# Patient Record
Sex: FEMALE | Race: WHITE | Employment: FULL TIME | URBAN - NONMETROPOLITAN AREA
[De-identification: names, ages, dates, MRNs, and addresses within clinical notes are randomized per-mention and may not be internally consistent; named-entity substitution may affect disease eponyms.]

---

## 2017-01-28 ENCOUNTER — OFFICE VISIT (OUTPATIENT)
Dept: URGENT CARE | Age: 42
End: 2017-01-28
Payer: COMMERCIAL

## 2017-01-28 VITALS
WEIGHT: 266 LBS | HEIGHT: 72 IN | BODY MASS INDEX: 36.03 KG/M2 | SYSTOLIC BLOOD PRESSURE: 122 MMHG | TEMPERATURE: 98.8 F | DIASTOLIC BLOOD PRESSURE: 87 MMHG | HEART RATE: 88 BPM | OXYGEN SATURATION: 99 %

## 2017-01-28 DIAGNOSIS — J32.9 SINUSITIS, UNSPECIFIED CHRONICITY, UNSPECIFIED LOCATION: ICD-10-CM

## 2017-01-28 DIAGNOSIS — J40 BRONCHITIS: Primary | ICD-10-CM

## 2017-01-28 PROCEDURE — 99213 OFFICE O/P EST LOW 20 MIN: CPT | Performed by: NURSE PRACTITIONER

## 2017-01-28 RX ORDER — BENZONATATE 100 MG/1
200 CAPSULE ORAL 3 TIMES DAILY PRN
Qty: 30 CAPSULE | Refills: 0 | Status: SHIPPED | OUTPATIENT
Start: 2017-01-28 | End: 2017-02-04

## 2017-01-28 RX ORDER — AMOXICILLIN AND CLAVULANATE POTASSIUM 875; 125 MG/1; MG/1
1 TABLET, FILM COATED ORAL 2 TIMES DAILY
Qty: 20 TABLET | Refills: 0 | Status: SHIPPED | OUTPATIENT
Start: 2017-01-28 | End: 2017-02-07

## 2017-01-28 ASSESSMENT — ENCOUNTER SYMPTOMS
VOMITING: 0
SHORTNESS OF BREATH: 0
EYE DISCHARGE: 0
ABDOMINAL PAIN: 0
PHOTOPHOBIA: 0
WHEEZING: 1
CONSTIPATION: 0
SINUS PRESSURE: 1
EYE PAIN: 0
DIARRHEA: 0
SORE THROAT: 0
COUGH: 1
NAUSEA: 0
BLOOD IN STOOL: 0

## 2017-03-10 ENCOUNTER — OFFICE VISIT (OUTPATIENT)
Dept: URGENT CARE | Age: 42
End: 2017-03-10
Payer: COMMERCIAL

## 2017-03-10 VITALS
BODY MASS INDEX: 34.95 KG/M2 | DIASTOLIC BLOOD PRESSURE: 98 MMHG | HEIGHT: 72 IN | SYSTOLIC BLOOD PRESSURE: 136 MMHG | WEIGHT: 258 LBS | RESPIRATION RATE: 20 BRPM | HEART RATE: 80 BPM | OXYGEN SATURATION: 96 % | TEMPERATURE: 98.1 F

## 2017-03-10 DIAGNOSIS — J06.9 UPPER RESPIRATORY TRACT INFECTION, UNSPECIFIED TYPE: Primary | ICD-10-CM

## 2017-03-10 PROCEDURE — 96372 THER/PROPH/DIAG INJ SC/IM: CPT | Performed by: NURSE PRACTITIONER

## 2017-03-10 PROCEDURE — 99213 OFFICE O/P EST LOW 20 MIN: CPT | Performed by: NURSE PRACTITIONER

## 2017-03-10 RX ORDER — DEXAMETHASONE SODIUM PHOSPHATE 4 MG/ML
4 INJECTION, SOLUTION INTRA-ARTICULAR; INTRALESIONAL; INTRAMUSCULAR; INTRAVENOUS; SOFT TISSUE ONCE
Status: COMPLETED | OUTPATIENT
Start: 2017-03-10 | End: 2017-03-10

## 2017-03-10 RX ORDER — METHYLPREDNISOLONE 4 MG/1
TABLET ORAL
Qty: 1 KIT | Refills: 0 | Status: SHIPPED | OUTPATIENT
Start: 2017-03-10 | End: 2017-03-16

## 2017-03-10 RX ORDER — ALBUTEROL SULFATE 90 UG/1
2 AEROSOL, METERED RESPIRATORY (INHALATION) EVERY 6 HOURS PRN
Qty: 1 INHALER | Refills: 0 | Status: SHIPPED | OUTPATIENT
Start: 2017-03-10 | End: 2017-06-30 | Stop reason: ALTCHOICE

## 2017-03-10 RX ORDER — AZITHROMYCIN 250 MG/1
TABLET, FILM COATED ORAL
Qty: 1 PACKET | Refills: 0 | Status: SHIPPED | OUTPATIENT
Start: 2017-03-10 | End: 2017-03-20

## 2017-03-10 RX ADMIN — DEXAMETHASONE SODIUM PHOSPHATE 4 MG: 4 INJECTION, SOLUTION INTRA-ARTICULAR; INTRALESIONAL; INTRAMUSCULAR; INTRAVENOUS; SOFT TISSUE at 19:06

## 2017-03-10 ASSESSMENT — ENCOUNTER SYMPTOMS
COUGH: 1
SORE THROAT: 0
SHORTNESS OF BREATH: 1
SINUS PRESSURE: 1
EYES NEGATIVE: 1
GASTROINTESTINAL NEGATIVE: 1
WHEEZING: 1

## 2017-06-30 ENCOUNTER — HOSPITAL ENCOUNTER (EMERGENCY)
Age: 42
Discharge: HOME | End: 2017-06-30
Attending: FAMILY MEDICINE
Payer: COMMERCIAL

## 2017-06-30 ENCOUNTER — APPOINTMENT (OUTPATIENT)
Dept: GENERAL RADIOLOGY | Age: 42
End: 2017-06-30
Payer: COMMERCIAL

## 2017-06-30 ENCOUNTER — APPOINTMENT (OUTPATIENT)
Dept: CT IMAGING | Age: 42
End: 2017-06-30
Payer: COMMERCIAL

## 2017-06-30 VITALS
DIASTOLIC BLOOD PRESSURE: 84 MMHG | OXYGEN SATURATION: 99 % | WEIGHT: 253 LBS | HEIGHT: 72 IN | TEMPERATURE: 98 F | HEART RATE: 68 BPM | BODY MASS INDEX: 34.27 KG/M2 | SYSTOLIC BLOOD PRESSURE: 124 MMHG | RESPIRATION RATE: 16 BRPM

## 2017-06-30 DIAGNOSIS — R07.89 OTHER CHEST PAIN: Primary | ICD-10-CM

## 2017-06-30 LAB
ALBUMIN SERPL-MCNC: 4.3 G/DL (ref 3.5–5.2)
ALP BLD-CCNC: 116 U/L (ref 35–104)
ALT SERPL-CCNC: 23 U/L (ref 5–33)
ANION GAP SERPL CALCULATED.3IONS-SCNC: 13 MMOL/L (ref 7–19)
AST SERPL-CCNC: 22 U/L (ref 5–32)
BASOPHILS ABSOLUTE: 0 K/UL (ref 0–0.2)
BASOPHILS RELATIVE PERCENT: 0.2 % (ref 0–1)
BILIRUB SERPL-MCNC: 0.4 MG/DL (ref 0.2–1.2)
BUN BLDV-MCNC: 12 MG/DL (ref 6–20)
CALCIUM SERPL-MCNC: 9 MG/DL (ref 8.6–10)
CHLORIDE BLD-SCNC: 103 MMOL/L (ref 98–111)
CO2: 25 MMOL/L (ref 22–29)
CREAT SERPL-MCNC: 0.7 MG/DL (ref 0.5–0.9)
D DIMER: 0.52 NG/ML DDU (ref 0–0.48)
EOSINOPHILS ABSOLUTE: 0.1 K/UL (ref 0–0.6)
EOSINOPHILS RELATIVE PERCENT: 0.8 % (ref 0–5)
GFR NON-AFRICAN AMERICAN: >60
GLUCOSE BLD-MCNC: 80 MG/DL (ref 74–109)
HCG QUALITATIVE: NEGATIVE
HCT VFR BLD CALC: 43.2 % (ref 37–47)
HEMOGLOBIN: 14.6 G/DL (ref 12–16)
INR BLD: 0.99 (ref 0.88–1.18)
LYMPHOCYTES ABSOLUTE: 3.5 K/UL (ref 1.1–4.5)
LYMPHOCYTES RELATIVE PERCENT: 35.6 % (ref 20–40)
MCH RBC QN AUTO: 30.7 PG (ref 27–31)
MCHC RBC AUTO-ENTMCNC: 33.8 G/DL (ref 33–37)
MCV RBC AUTO: 90.8 FL (ref 81–99)
MONOCYTES ABSOLUTE: 0.5 K/UL (ref 0–0.9)
MONOCYTES RELATIVE PERCENT: 5.5 % (ref 0–10)
NEUTROPHILS ABSOLUTE: 5.6 K/UL (ref 1.5–7.5)
NEUTROPHILS RELATIVE PERCENT: 57.7 % (ref 50–65)
PDW BLD-RTO: 12.6 % (ref 11.5–14.5)
PERFORMED ON: NORMAL
PERFORMED ON: NORMAL
PLATELET # BLD: 299 K/UL (ref 130–400)
PMV BLD AUTO: 9.4 FL (ref 9.4–12.3)
POC TROPONIN I: 0 NG/ML (ref 0–0.08)
POC TROPONIN I: 0 NG/ML (ref 0–0.08)
POTASSIUM SERPL-SCNC: 4.1 MMOL/L (ref 3.5–5)
PROTHROMBIN TIME: 13 SEC (ref 12–14.6)
RBC # BLD: 4.76 M/UL (ref 4.2–5.4)
SODIUM BLD-SCNC: 141 MMOL/L (ref 136–145)
TOTAL PROTEIN: 8.2 G/DL (ref 6.6–8.7)
WBC # BLD: 9.7 K/UL (ref 4.8–10.8)

## 2017-06-30 PROCEDURE — 93005 ELECTROCARDIOGRAM TRACING: CPT

## 2017-06-30 PROCEDURE — 84484 ASSAY OF TROPONIN QUANT: CPT

## 2017-06-30 PROCEDURE — 99282 EMERGENCY DEPT VISIT SF MDM: CPT | Performed by: FAMILY MEDICINE

## 2017-06-30 PROCEDURE — 6360000004 HC RX CONTRAST MEDICATION: Performed by: FAMILY MEDICINE

## 2017-06-30 PROCEDURE — 84703 CHORIONIC GONADOTROPIN ASSAY: CPT

## 2017-06-30 PROCEDURE — 71275 CT ANGIOGRAPHY CHEST: CPT

## 2017-06-30 PROCEDURE — 99285 EMERGENCY DEPT VISIT HI MDM: CPT

## 2017-06-30 PROCEDURE — 85379 FIBRIN DEGRADATION QUANT: CPT

## 2017-06-30 PROCEDURE — 6370000000 HC RX 637 (ALT 250 FOR IP)

## 2017-06-30 PROCEDURE — 85025 COMPLETE CBC W/AUTO DIFF WBC: CPT

## 2017-06-30 PROCEDURE — 80053 COMPREHEN METABOLIC PANEL: CPT

## 2017-06-30 PROCEDURE — 93971 EXTREMITY STUDY: CPT

## 2017-06-30 PROCEDURE — 71010 XR CHEST PORTABLE: CPT

## 2017-06-30 PROCEDURE — 85610 PROTHROMBIN TIME: CPT

## 2017-06-30 PROCEDURE — 6370000000 HC RX 637 (ALT 250 FOR IP): Performed by: FAMILY MEDICINE

## 2017-06-30 PROCEDURE — 36415 COLL VENOUS BLD VENIPUNCTURE: CPT

## 2017-06-30 RX ORDER — ASPIRIN 325 MG
TABLET ORAL
Status: COMPLETED
Start: 2017-06-30 | End: 2017-06-30

## 2017-06-30 RX ORDER — ASPIRIN 325 MG
325 TABLET ORAL ONCE
Status: DISCONTINUED | OUTPATIENT
Start: 2017-06-30 | End: 2017-07-01 | Stop reason: HOSPADM

## 2017-06-30 RX ADMIN — ASPIRIN 325 MG ORAL TABLET 325 MG: 325 PILL ORAL at 18:23

## 2017-06-30 RX ADMIN — IOVERSOL 90 ML: 741 INJECTION INTRA-ARTERIAL; INTRAVENOUS at 21:46

## 2017-06-30 ASSESSMENT — ENCOUNTER SYMPTOMS
CONSTIPATION: 0
TROUBLE SWALLOWING: 0
BACK PAIN: 0
WHEEZING: 0
APNEA: 0
VOMITING: 0
ABDOMINAL PAIN: 0
ABDOMINAL DISTENTION: 0
SHORTNESS OF BREATH: 0
SORE THROAT: 0
CHEST TIGHTNESS: 1
COUGH: 0
DIARRHEA: 0

## 2017-07-04 LAB
EKG P AXIS: -10 DEGREES
EKG P AXIS: 22 DEGREES
EKG P-R INTERVAL: 138 MS
EKG P-R INTERVAL: 142 MS
EKG Q-T INTERVAL: 386 MS
EKG Q-T INTERVAL: 428 MS
EKG QRS DURATION: 82 MS
EKG QRS DURATION: 84 MS
EKG QTC CALCULATION (BAZETT): 407 MS
EKG QTC CALCULATION (BAZETT): 435 MS
EKG T AXIS: 4 DEGREES
EKG T AXIS: 8 DEGREES

## 2017-07-14 ENCOUNTER — HOSPITAL ENCOUNTER (OUTPATIENT)
Dept: ULTRASOUND IMAGING | Age: 42
Discharge: HOME | End: 2017-07-14
Payer: COMMERCIAL

## 2017-07-14 DIAGNOSIS — R10.11 ABDOMINAL PAIN, RIGHT UPPER QUADRANT: ICD-10-CM

## 2017-07-14 PROCEDURE — 76705 ECHO EXAM OF ABDOMEN: CPT

## 2017-07-19 ENCOUNTER — OFFICE VISIT (OUTPATIENT)
Dept: URGENT CARE | Age: 42
End: 2017-07-19
Payer: COMMERCIAL

## 2017-07-19 VITALS
DIASTOLIC BLOOD PRESSURE: 91 MMHG | HEIGHT: 72 IN | SYSTOLIC BLOOD PRESSURE: 121 MMHG | HEART RATE: 82 BPM | OXYGEN SATURATION: 98 % | BODY MASS INDEX: 34.81 KG/M2 | RESPIRATION RATE: 20 BRPM | WEIGHT: 257 LBS | TEMPERATURE: 97.7 F

## 2017-07-19 DIAGNOSIS — L23.7 ALLERGIC CONTACT DERMATITIS DUE TO PLANTS, EXCEPT FOOD: Primary | ICD-10-CM

## 2017-07-19 DIAGNOSIS — J20.8 ACUTE BRONCHITIS DUE TO OTHER SPECIFIED ORGANISMS: ICD-10-CM

## 2017-07-19 PROCEDURE — 99213 OFFICE O/P EST LOW 20 MIN: CPT | Performed by: PHYSICIAN ASSISTANT

## 2017-07-19 RX ORDER — METHYLPREDNISOLONE 4 MG/1
TABLET ORAL
Qty: 1 KIT | Refills: 0 | Status: SHIPPED | OUTPATIENT
Start: 2017-07-19 | End: 2017-07-25

## 2017-07-19 RX ORDER — TRIAMCINOLONE ACETONIDE 40 MG/ML
40 INJECTION, SUSPENSION INTRA-ARTICULAR; INTRAMUSCULAR ONCE
Status: SHIPPED | OUTPATIENT
Start: 2017-07-19

## 2017-07-19 RX ORDER — DEXAMETHASONE SODIUM PHOSPHATE 10 MG/ML
10 INJECTION INTRAMUSCULAR; INTRAVENOUS ONCE
Status: DISCONTINUED | OUTPATIENT
Start: 2017-07-19 | End: 2017-07-19

## 2017-08-28 ENCOUNTER — OFFICE VISIT (OUTPATIENT)
Dept: GASTROENTEROLOGY | Facility: CLINIC | Age: 42
End: 2017-08-28

## 2017-08-28 VITALS
WEIGHT: 255 LBS | TEMPERATURE: 97.5 F | OXYGEN SATURATION: 100 % | HEIGHT: 72 IN | HEART RATE: 87 BPM | BODY MASS INDEX: 34.54 KG/M2 | DIASTOLIC BLOOD PRESSURE: 76 MMHG | SYSTOLIC BLOOD PRESSURE: 130 MMHG

## 2017-08-28 DIAGNOSIS — Z83.71 FAMILY HISTORY OF COLONIC POLYPS: ICD-10-CM

## 2017-08-28 DIAGNOSIS — R10.11 RIGHT UPPER QUADRANT ABDOMINAL PAIN: Primary | ICD-10-CM

## 2017-08-28 PROCEDURE — 99204 OFFICE O/P NEW MOD 45 MIN: CPT | Performed by: NURSE PRACTITIONER

## 2017-08-29 PROBLEM — R10.10 PAIN OF UPPER ABDOMEN: Status: ACTIVE | Noted: 2017-08-29

## 2017-09-20 ENCOUNTER — ANESTHESIA (OUTPATIENT)
Dept: GASTROENTEROLOGY | Facility: HOSPITAL | Age: 42
End: 2017-09-20

## 2017-09-20 ENCOUNTER — PREP FOR SURGERY (OUTPATIENT)
Dept: OTHER | Facility: HOSPITAL | Age: 42
End: 2017-09-20

## 2017-09-20 ENCOUNTER — HOSPITAL ENCOUNTER (OUTPATIENT)
Facility: HOSPITAL | Age: 42
Setting detail: HOSPITAL OUTPATIENT SURGERY
Discharge: HOME | End: 2017-09-20
Attending: INTERNAL MEDICINE | Admitting: INTERNAL MEDICINE

## 2017-09-20 ENCOUNTER — ANESTHESIA EVENT (OUTPATIENT)
Dept: GASTROENTEROLOGY | Facility: HOSPITAL | Age: 42
End: 2017-09-20

## 2017-09-20 VITALS
BODY MASS INDEX: 34.27 KG/M2 | WEIGHT: 253 LBS | HEART RATE: 72 BPM | RESPIRATION RATE: 18 BRPM | OXYGEN SATURATION: 99 % | HEIGHT: 72 IN | SYSTOLIC BLOOD PRESSURE: 118 MMHG | DIASTOLIC BLOOD PRESSURE: 72 MMHG | TEMPERATURE: 97.4 F

## 2017-09-20 DIAGNOSIS — R10.11 RIGHT UPPER QUADRANT ABDOMINAL PAIN: ICD-10-CM

## 2017-09-20 DIAGNOSIS — R10.84 GENERALIZED ABDOMINAL PAIN: Primary | ICD-10-CM

## 2017-09-20 LAB — B-HCG UR QL: NEGATIVE

## 2017-09-20 PROCEDURE — 45378 DIAGNOSTIC COLONOSCOPY: CPT | Performed by: INTERNAL MEDICINE

## 2017-09-20 PROCEDURE — 25010000002 PROPOFOL 10 MG/ML EMULSION: Performed by: NURSE ANESTHETIST, CERTIFIED REGISTERED

## 2017-09-20 PROCEDURE — 81025 URINE PREGNANCY TEST: CPT | Performed by: ANESTHESIOLOGY

## 2017-09-20 RX ORDER — SODIUM CHLORIDE 9 MG/ML
500 INJECTION, SOLUTION INTRAVENOUS CONTINUOUS PRN
Status: DISCONTINUED | OUTPATIENT
Start: 2017-09-20 | End: 2017-09-20 | Stop reason: HOSPADM

## 2017-09-20 RX ORDER — LIDOCAINE HYDROCHLORIDE 20 MG/ML
INJECTION, SOLUTION INFILTRATION; PERINEURAL AS NEEDED
Status: DISCONTINUED | OUTPATIENT
Start: 2017-09-20 | End: 2017-09-20 | Stop reason: SURG

## 2017-09-20 RX ORDER — PROPOFOL 10 MG/ML
VIAL (ML) INTRAVENOUS AS NEEDED
Status: DISCONTINUED | OUTPATIENT
Start: 2017-09-20 | End: 2017-09-20 | Stop reason: SURG

## 2017-09-20 RX ORDER — SODIUM CHLORIDE 0.9 % (FLUSH) 0.9 %
3 SYRINGE (ML) INJECTION AS NEEDED
Status: DISCONTINUED | OUTPATIENT
Start: 2017-09-20 | End: 2017-09-20 | Stop reason: HOSPADM

## 2017-09-20 RX ADMIN — LIDOCAINE HYDROCHLORIDE 100 MG: 20 INJECTION, SOLUTION INFILTRATION; PERINEURAL at 11:55

## 2017-09-20 RX ADMIN — PROPOFOL 300 MG: 10 INJECTION, EMULSION INTRAVENOUS at 11:55

## 2017-09-20 RX ADMIN — SODIUM CHLORIDE 500 ML: 9 INJECTION, SOLUTION INTRAVENOUS at 10:34

## 2017-09-21 ENCOUNTER — TELEPHONE (OUTPATIENT)
Dept: GASTROENTEROLOGY | Facility: CLINIC | Age: 42
End: 2017-09-21

## 2017-11-08 PROBLEM — R10.84 GENERALIZED ABDOMINAL PAIN: Status: ACTIVE | Noted: 2017-11-08

## 2017-11-10 ENCOUNTER — ANESTHESIA EVENT (OUTPATIENT)
Dept: GASTROENTEROLOGY | Facility: HOSPITAL | Age: 42
End: 2017-11-10

## 2017-11-10 ENCOUNTER — ANESTHESIA (OUTPATIENT)
Dept: GASTROENTEROLOGY | Facility: HOSPITAL | Age: 42
End: 2017-11-10

## 2017-11-10 ENCOUNTER — HOSPITAL ENCOUNTER (OUTPATIENT)
Facility: HOSPITAL | Age: 42
Setting detail: HOSPITAL OUTPATIENT SURGERY
Discharge: HOME | End: 2017-11-10
Attending: INTERNAL MEDICINE | Admitting: INTERNAL MEDICINE

## 2017-11-10 VITALS
RESPIRATION RATE: 17 BRPM | HEIGHT: 72 IN | HEART RATE: 66 BPM | OXYGEN SATURATION: 96 % | WEIGHT: 255 LBS | BODY MASS INDEX: 34.54 KG/M2 | SYSTOLIC BLOOD PRESSURE: 115 MMHG | TEMPERATURE: 97.1 F | DIASTOLIC BLOOD PRESSURE: 90 MMHG

## 2017-11-10 DIAGNOSIS — R10.84 GENERALIZED ABDOMINAL PAIN: ICD-10-CM

## 2017-11-10 LAB — B-HCG UR QL: NEGATIVE

## 2017-11-10 PROCEDURE — 87081 CULTURE SCREEN ONLY: CPT | Performed by: INTERNAL MEDICINE

## 2017-11-10 PROCEDURE — 81025 URINE PREGNANCY TEST: CPT | Performed by: ANESTHESIOLOGY

## 2017-11-10 PROCEDURE — 25010000002 PROPOFOL 10 MG/ML EMULSION: Performed by: NURSE ANESTHETIST, CERTIFIED REGISTERED

## 2017-11-10 PROCEDURE — 43239 EGD BIOPSY SINGLE/MULTIPLE: CPT | Performed by: INTERNAL MEDICINE

## 2017-11-10 PROCEDURE — 88305 TISSUE EXAM BY PATHOLOGIST: CPT | Performed by: INTERNAL MEDICINE

## 2017-11-10 RX ORDER — LIDOCAINE HYDROCHLORIDE 20 MG/ML
INJECTION, SOLUTION INFILTRATION; PERINEURAL AS NEEDED
Status: DISCONTINUED | OUTPATIENT
Start: 2017-11-10 | End: 2017-11-10 | Stop reason: SURG

## 2017-11-10 RX ORDER — SODIUM CHLORIDE 0.9 % (FLUSH) 0.9 %
3 SYRINGE (ML) INJECTION AS NEEDED
Status: DISCONTINUED | OUTPATIENT
Start: 2017-11-10 | End: 2017-11-10 | Stop reason: HOSPADM

## 2017-11-10 RX ORDER — SODIUM CHLORIDE 9 MG/ML
500 INJECTION, SOLUTION INTRAVENOUS CONTINUOUS PRN
Status: DISCONTINUED | OUTPATIENT
Start: 2017-11-10 | End: 2017-11-10 | Stop reason: HOSPADM

## 2017-11-10 RX ORDER — PROPOFOL 10 MG/ML
VIAL (ML) INTRAVENOUS AS NEEDED
Status: DISCONTINUED | OUTPATIENT
Start: 2017-11-10 | End: 2017-11-10 | Stop reason: SURG

## 2017-11-10 RX ADMIN — PROPOFOL 200 MG: 10 INJECTION, EMULSION INTRAVENOUS at 09:11

## 2017-11-10 RX ADMIN — LIDOCAINE HYDROCHLORIDE 0.5 ML: 10 INJECTION, SOLUTION EPIDURAL; INFILTRATION; INTRACAUDAL; PERINEURAL at 08:37

## 2017-11-10 RX ADMIN — LIDOCAINE HYDROCHLORIDE 100 MG: 20 INJECTION, SOLUTION INFILTRATION; PERINEURAL at 09:11

## 2017-11-10 RX ADMIN — SODIUM CHLORIDE 500 ML: 9 INJECTION, SOLUTION INTRAVENOUS at 08:37

## 2017-11-11 LAB — UREASE TISS QL: POSITIVE

## 2017-11-13 RX ORDER — OMEPRAZOLE 20 MG/1
20 CAPSULE, DELAYED RELEASE ORAL 2 TIMES DAILY
Qty: 20 CAPSULE | Refills: 0 | Status: SHIPPED | OUTPATIENT
Start: 2017-11-13 | End: 2017-11-23

## 2017-11-13 RX ORDER — BISMUTH SUBSALICYLATE 262 MG
262 TABLET,CHEWABLE ORAL 4 TIMES DAILY
Qty: 40 TABLET | Refills: 0 | Status: SHIPPED | OUTPATIENT
Start: 2017-11-13

## 2017-11-13 RX ORDER — TETRACYCLINE HYDROCHLORIDE 500 MG/1
500 CAPSULE ORAL 4 TIMES DAILY
Qty: 40 CAPSULE | Refills: 0 | Status: SHIPPED | OUTPATIENT
Start: 2017-11-13 | End: 2017-11-23

## 2017-11-13 RX ORDER — METRONIDAZOLE 250 MG/1
250 TABLET ORAL 4 TIMES DAILY
Qty: 40 TABLET | Refills: 0 | Status: SHIPPED | OUTPATIENT
Start: 2017-11-13 | End: 2017-11-23

## 2017-11-14 LAB
CYTO UR: NORMAL
LAB AP CASE REPORT: NORMAL
LAB AP CLINICAL INFORMATION: NORMAL
Lab: NORMAL
PATH REPORT.FINAL DX SPEC: NORMAL
PATH REPORT.GROSS SPEC: NORMAL

## 2017-11-23 ENCOUNTER — APPOINTMENT (OUTPATIENT)
Dept: CT IMAGING | Age: 42
End: 2017-11-23
Payer: COMMERCIAL

## 2017-11-23 ENCOUNTER — HOSPITAL ENCOUNTER (EMERGENCY)
Age: 42
Discharge: HOME | End: 2017-11-23
Attending: EMERGENCY MEDICINE
Payer: COMMERCIAL

## 2017-11-23 VITALS
TEMPERATURE: 98.9 F | OXYGEN SATURATION: 95 % | SYSTOLIC BLOOD PRESSURE: 110 MMHG | RESPIRATION RATE: 16 BRPM | DIASTOLIC BLOOD PRESSURE: 84 MMHG | BODY MASS INDEX: 33.86 KG/M2 | WEIGHT: 250 LBS | HEIGHT: 72 IN | HEART RATE: 80 BPM

## 2017-11-23 DIAGNOSIS — R10.9 RIGHT-SIDED ABDOMINAL PAIN OF UNKNOWN CAUSE: Primary | ICD-10-CM

## 2017-11-23 DIAGNOSIS — R93.5 ABNORMAL ABDOMINAL CT SCAN: ICD-10-CM

## 2017-11-23 LAB
ALBUMIN SERPL-MCNC: 3.8 G/DL (ref 3.5–5.2)
ALP BLD-CCNC: 112 U/L (ref 35–104)
ALT SERPL-CCNC: 21 U/L (ref 5–33)
AMYLASE: 62 U/L (ref 28–100)
ANION GAP SERPL CALCULATED.3IONS-SCNC: 14 MMOL/L (ref 7–19)
AST SERPL-CCNC: 21 U/L (ref 5–32)
BACTERIA: NEGATIVE /HPF
BASOPHILS ABSOLUTE: 0 K/UL (ref 0–0.2)
BASOPHILS RELATIVE PERCENT: 0.2 % (ref 0–1)
BILIRUB SERPL-MCNC: 0.3 MG/DL (ref 0.2–1.2)
BILIRUBIN URINE: NEGATIVE
BILIRUBIN URINE: NEGATIVE
BLOOD, URINE: ABNORMAL
BLOOD, URINE: NEGATIVE
BUN BLDV-MCNC: 15 MG/DL (ref 6–20)
C-REACTIVE PROTEIN: 0.49 MG/DL (ref 0–0.5)
CALCIUM SERPL-MCNC: 8.8 MG/DL (ref 8.6–10)
CHLORIDE BLD-SCNC: 103 MMOL/L (ref 98–111)
CLARITY: ABNORMAL
CLARITY: ABNORMAL
CO2: 24 MMOL/L (ref 22–29)
COLOR: ABNORMAL
COLOR: YELLOW
CREAT SERPL-MCNC: 0.7 MG/DL (ref 0.5–0.9)
EOSINOPHILS ABSOLUTE: 0.1 K/UL (ref 0–0.6)
EOSINOPHILS RELATIVE PERCENT: 0.6 % (ref 0–5)
EPITHELIAL CELLS, UA: 6 /HPF (ref 0–5)
GFR NON-AFRICAN AMERICAN: >60
GLUCOSE BLD-MCNC: 110 MG/DL (ref 74–109)
GLUCOSE URINE: NEGATIVE MG/DL
GLUCOSE URINE: NEGATIVE MG/DL
HCG(URINE) PREGNANCY TEST: NEGATIVE
HCT VFR BLD CALC: 45 % (ref 37–47)
HEMOGLOBIN: 14.8 G/DL (ref 12–16)
HYALINE CASTS: 2 /HPF (ref 0–8)
KETONES, URINE: NEGATIVE MG/DL
KETONES, URINE: NEGATIVE MG/DL
LEUKOCYTE ESTERASE, URINE: ABNORMAL
LEUKOCYTE ESTERASE, URINE: NEGATIVE
LIPASE: 28 U/L (ref 13–60)
LYMPHOCYTES ABSOLUTE: 3.1 K/UL (ref 1.1–4.5)
LYMPHOCYTES RELATIVE PERCENT: 25.6 % (ref 20–40)
MCH RBC QN AUTO: 30.7 PG (ref 27–31)
MCHC RBC AUTO-ENTMCNC: 32.9 G/DL (ref 33–37)
MCV RBC AUTO: 93.4 FL (ref 81–99)
MONOCYTES ABSOLUTE: 0.7 K/UL (ref 0–0.9)
MONOCYTES RELATIVE PERCENT: 5.8 % (ref 0–10)
NEUTROPHILS ABSOLUTE: 8.1 K/UL (ref 1.5–7.5)
NEUTROPHILS RELATIVE PERCENT: 67.5 % (ref 50–65)
NITRITE, URINE: NEGATIVE
NITRITE, URINE: NEGATIVE
PDW BLD-RTO: 12.6 % (ref 11.5–14.5)
PH UA: 6
PH UA: 6
PLATELET # BLD: 322 K/UL (ref 130–400)
PMV BLD AUTO: 9.5 FL (ref 9.4–12.3)
POTASSIUM SERPL-SCNC: 4.2 MMOL/L (ref 3.5–5)
PROTEIN UA: NEGATIVE MG/DL
PROTEIN UA: NEGATIVE MG/DL
RBC # BLD: 4.82 M/UL (ref 4.2–5.4)
RBC UA: 291 /HPF (ref 0–4)
SODIUM BLD-SCNC: 141 MMOL/L (ref 136–145)
SPECIFIC GRAVITY UA: 1.03
SPECIFIC GRAVITY UA: 1.04
TOTAL PROTEIN: 7.2 G/DL (ref 6.6–8.7)
UROBILINOGEN, URINE: 0.2 E.U./DL
UROBILINOGEN, URINE: 0.2 E.U./DL
WBC # BLD: 12 K/UL (ref 4.8–10.8)
WBC UA: 2 /HPF (ref 0–5)

## 2017-11-23 PROCEDURE — 81003 URINALYSIS AUTO W/O SCOPE: CPT

## 2017-11-23 PROCEDURE — 83690 ASSAY OF LIPASE: CPT

## 2017-11-23 PROCEDURE — 96374 THER/PROPH/DIAG INJ IV PUSH: CPT

## 2017-11-23 PROCEDURE — 87086 URINE CULTURE/COLONY COUNT: CPT

## 2017-11-23 PROCEDURE — 2500000003 HC RX 250 WO HCPCS: Performed by: EMERGENCY MEDICINE

## 2017-11-23 PROCEDURE — 2580000003 HC RX 258: Performed by: NURSE PRACTITIONER

## 2017-11-23 PROCEDURE — 80053 COMPREHEN METABOLIC PANEL: CPT

## 2017-11-23 PROCEDURE — 85025 COMPLETE CBC W/AUTO DIFF WBC: CPT

## 2017-11-23 PROCEDURE — 87186 SC STD MICRODIL/AGAR DIL: CPT

## 2017-11-23 PROCEDURE — 87077 CULTURE AEROBIC IDENTIFY: CPT

## 2017-11-23 PROCEDURE — 6360000002 HC RX W HCPCS: Performed by: NURSE PRACTITIONER

## 2017-11-23 PROCEDURE — 6360000004 HC RX CONTRAST MEDICATION: Performed by: EMERGENCY MEDICINE

## 2017-11-23 PROCEDURE — 99284 EMERGENCY DEPT VISIT MOD MDM: CPT | Performed by: EMERGENCY MEDICINE

## 2017-11-23 PROCEDURE — S0028 INJECTION, FAMOTIDINE, 20 MG: HCPCS | Performed by: EMERGENCY MEDICINE

## 2017-11-23 PROCEDURE — 74177 CT ABD & PELVIS W/CONTRAST: CPT

## 2017-11-23 PROCEDURE — 36415 COLL VENOUS BLD VENIPUNCTURE: CPT

## 2017-11-23 PROCEDURE — 86140 C-REACTIVE PROTEIN: CPT

## 2017-11-23 PROCEDURE — 99284 EMERGENCY DEPT VISIT MOD MDM: CPT

## 2017-11-23 PROCEDURE — 82150 ASSAY OF AMYLASE: CPT

## 2017-11-23 PROCEDURE — 96375 TX/PRO/DX INJ NEW DRUG ADDON: CPT

## 2017-11-23 PROCEDURE — 81001 URINALYSIS AUTO W/SCOPE: CPT

## 2017-11-23 PROCEDURE — 81025 URINE PREGNANCY TEST: CPT

## 2017-11-23 RX ORDER — 0.9 % SODIUM CHLORIDE 0.9 %
1000 INTRAVENOUS SOLUTION INTRAVENOUS ONCE
Status: COMPLETED | OUTPATIENT
Start: 2017-11-23 | End: 2017-11-23

## 2017-11-23 RX ORDER — ONDANSETRON 2 MG/ML
4 INJECTION INTRAMUSCULAR; INTRAVENOUS ONCE
Status: COMPLETED | OUTPATIENT
Start: 2017-11-23 | End: 2017-11-23

## 2017-11-23 RX ORDER — MORPHINE SULFATE 1 MG/ML
4 INJECTION, SOLUTION EPIDURAL; INTRATHECAL; INTRAVENOUS ONCE
Status: DISCONTINUED | OUTPATIENT
Start: 2017-11-23 | End: 2017-11-23 | Stop reason: HOSPADM

## 2017-11-23 RX ORDER — METRONIDAZOLE 250 MG/1
250 TABLET ORAL 4 TIMES DAILY
COMMUNITY
End: 2018-10-20 | Stop reason: ALTCHOICE

## 2017-11-23 RX ORDER — OMEPRAZOLE 20 MG/1
20 CAPSULE, DELAYED RELEASE ORAL 2 TIMES DAILY
COMMUNITY
End: 2018-10-31 | Stop reason: ALTCHOICE

## 2017-11-23 RX ORDER — TETRACYCLINE HYDROCHLORIDE 500 MG/1
500 CAPSULE ORAL 4 TIMES DAILY
COMMUNITY

## 2017-11-23 RX ADMIN — FAMOTIDINE 40 MG: 10 INJECTION, SOLUTION INTRAVENOUS at 03:40

## 2017-11-23 RX ADMIN — IOPAMIDOL 90 ML: 755 INJECTION, SOLUTION INTRAVENOUS at 03:44

## 2017-11-23 RX ADMIN — SODIUM CHLORIDE 1000 ML: 9 INJECTION, SOLUTION INTRAVENOUS at 01:37

## 2017-11-23 RX ADMIN — ONDANSETRON 4 MG: 2 INJECTION INTRAMUSCULAR; INTRAVENOUS at 01:37

## 2017-11-23 ASSESSMENT — PAIN SCALES - GENERAL: PAINLEVEL_OUTOF10: 7

## 2017-11-23 ASSESSMENT — ENCOUNTER SYMPTOMS
CONSTIPATION: 0
DIARRHEA: 0
NAUSEA: 1
SHORTNESS OF BREATH: 0
VOMITING: 0
COUGH: 0
SORE THROAT: 0
ABDOMINAL PAIN: 1
TROUBLE SWALLOWING: 0
SINUS PRESSURE: 0
RHINORRHEA: 0

## 2017-11-23 ASSESSMENT — PAIN DESCRIPTION - LOCATION: LOCATION: ABDOMEN

## 2017-11-23 ASSESSMENT — PAIN DESCRIPTION - ORIENTATION: ORIENTATION: RIGHT;LOWER

## 2017-11-25 LAB
ORGANISM: ABNORMAL
URINE CULTURE, ROUTINE: ABNORMAL
URINE CULTURE, ROUTINE: ABNORMAL

## 2017-11-27 ENCOUNTER — TELEPHONE (OUTPATIENT)
Dept: GASTROENTEROLOGY | Facility: CLINIC | Age: 42
End: 2017-11-27

## 2017-11-29 ENCOUNTER — TELEPHONE (OUTPATIENT)
Dept: GASTROENTEROLOGY | Facility: CLINIC | Age: 42
End: 2017-11-29

## 2017-12-09 ENCOUNTER — HOSPITAL ENCOUNTER (OUTPATIENT)
Dept: GENERAL RADIOLOGY | Age: 42
Discharge: HOME | End: 2017-12-09
Payer: COMMERCIAL

## 2017-12-09 DIAGNOSIS — M25.561 RIGHT KNEE PAIN, UNSPECIFIED CHRONICITY: ICD-10-CM

## 2017-12-09 PROCEDURE — 73560 X-RAY EXAM OF KNEE 1 OR 2: CPT

## 2017-12-16 ENCOUNTER — OFFICE VISIT (OUTPATIENT)
Dept: URGENT CARE | Age: 42
End: 2017-12-16
Payer: COMMERCIAL

## 2017-12-16 VITALS
HEART RATE: 85 BPM | TEMPERATURE: 97.9 F | SYSTOLIC BLOOD PRESSURE: 140 MMHG | WEIGHT: 260.13 LBS | HEIGHT: 72 IN | DIASTOLIC BLOOD PRESSURE: 95 MMHG | BODY MASS INDEX: 35.23 KG/M2 | OXYGEN SATURATION: 98 % | RESPIRATION RATE: 20 BRPM

## 2017-12-16 DIAGNOSIS — J06.9 URI WITH COUGH AND CONGESTION: Primary | ICD-10-CM

## 2017-12-16 PROCEDURE — G8484 FLU IMMUNIZE NO ADMIN: HCPCS | Performed by: PHYSICIAN ASSISTANT

## 2017-12-16 PROCEDURE — G8417 CALC BMI ABV UP PARAM F/U: HCPCS | Performed by: PHYSICIAN ASSISTANT

## 2017-12-16 PROCEDURE — 99213 OFFICE O/P EST LOW 20 MIN: CPT | Performed by: PHYSICIAN ASSISTANT

## 2017-12-16 PROCEDURE — 1036F TOBACCO NON-USER: CPT | Performed by: PHYSICIAN ASSISTANT

## 2017-12-16 PROCEDURE — G8427 DOCREV CUR MEDS BY ELIG CLIN: HCPCS | Performed by: PHYSICIAN ASSISTANT

## 2017-12-16 RX ORDER — AZITHROMYCIN 250 MG/1
TABLET, FILM COATED ORAL
Qty: 1 PACKET | Refills: 0 | Status: SHIPPED | OUTPATIENT
Start: 2017-12-16 | End: 2017-12-26

## 2017-12-16 RX ORDER — BISMUTH SUBSALICYLATE 262 MG
262 TABLET,CHEWABLE ORAL
COMMUNITY
Start: 2017-11-13

## 2017-12-16 RX ORDER — DEXTROMETHORPHAN HYDROBROMIDE AND PROMETHAZINE HYDROCHLORIDE 15; 6.25 MG/5ML; MG/5ML
5 SYRUP ORAL 4 TIMES DAILY PRN
Qty: 1 BOTTLE | Refills: 0 | Status: SHIPPED | OUTPATIENT
Start: 2017-12-16 | End: 2017-12-23

## 2017-12-16 ASSESSMENT — ENCOUNTER SYMPTOMS
COUGH: 1
GASTROINTESTINAL NEGATIVE: 1
SHORTNESS OF BREATH: 0
WHEEZING: 1

## 2017-12-16 NOTE — PROGRESS NOTES
[address] [facility] CARE  [address] [facility] [address]  Dept: [phone]  Loc: [phone]    Tanika Lane is a 36 y.o. female who presents today for her medical conditions/complaints as noted below. Tanika Lane is c/o of Cough and Congestion        HPI:       Cough   This is a new problem. The current episode started in the past 7 days. The problem has been gradually worsening. The problem occurs every few minutes. The cough is productive of sputum. Associated symptoms include chills, nasal congestion, postnasal drip and wheezing. Pertinent negatives include no ear pain, fever, rash or shortness of breath. Nothing aggravates the symptoms. She has tried OTC cough suppressant for the symptoms. The treatment provided no relief. Past Medical History:   Diagnosis Date    Chronic back pain     H. pylori infection 11/2017    Osteoarthritis       Past Surgical History:   Procedure Laterality Date    SPINE SURGERY      TUBAL LIGATION         No family history on file.     Social History   Substance Use Topics    Smoking status: Never Smoker    Smokeless tobacco: Never Used    Alcohol use No      Current Outpatient Prescriptions   Medication Sig Dispense Refill    Bismuth 262 MG CHEW Take 262 mg by mouth      azithromycin (ZITHROMAX) 250 MG tablet Take 2 tabs (500 mg) on Day 1, and take 1 tab (250 mg) on days 2 through 5. 1 packet 0    promethazine-dextromethorphan (PROMETHAZINE-DM) 6.25-15 MG/5ML syrup Take 5 mLs by mouth 4 times daily as needed for Cough 1 Bottle 0    tetracycline (ACHROMYCIN;SUMYCIN) 500 MG capsule Take 500 mg by mouth 4 times daily      omeprazole (PRILOSEC) 20 MG delayed release capsule Take 20 mg by mouth 2 times daily      metroNIDAZOLE (FLAGYL) 250 MG tablet Take 250 mg by mouth 4 times daily       Current Facility-Administered Medications   Medication Dose Route Frequency Provider Last Rate Last Dose    triamcinolone acetonide (KENALOG-40) injection 40 mg  40 mg Intramuscular Once Tk Rice PA-C         Allergies   Allergen Reactions    Cetirizine     Sweetness Enhancer     Triamcinolone Acetonide     Zyrtec Allergy [Cetirizine Hcl]     Botox [Botulinum Toxin Type A]      Had botox injections in neck. Caused her throat to become very dry and was hard to swallow. Health Maintenance   Topic Date Due    HIV screen  01/27/1989    DTaP/Tdap/Td vaccine (1 - Tdap) 01/27/1993    Cervical cancer screen  01/27/1995    Lipid screen  01/27/2014    Diabetes screen  01/27/2014    Flu vaccine (1) 09/01/2017       Subjective:      Review of Systems   Constitutional: Positive for chills. Negative for fever. HENT: Positive for postnasal drip. Negative for ear pain. Respiratory: Positive for cough and wheezing. Negative for shortness of breath. Gastrointestinal: Negative. Genitourinary: Negative. Musculoskeletal: Negative. Skin: Negative for rash. All others negative      Objective:     Physical Exam   Constitutional: No distress. HENT:   Mouth/Throat: No oropharyngeal exudate. Eyes: Left eye exhibits no discharge. No scleral icterus. Neck: No JVD present. No tracheal deviation present. No thyromegaly present. Cardiovascular: Exam reveals no gallop and no friction rub. Pulmonary/Chest: No stridor. She has no rales. Abdominal: She exhibits no distension and no mass. There is no rebound and no guarding. Musculoskeletal: She exhibits no edema. Neurological: No cranial nerve deficit. She exhibits normal muscle tone. Coordination normal.   Skin: She is not diaphoretic. No pallor.      BP (!) 140/95   Pulse 85   Temp 97.9 °F (36.6 °C) (Oral)   Resp 20   Ht 6' (1.829 m)   Wt 260 lb 2 oz (118 kg)   LMP 11/22/2017   SpO2 98%   BMI 35.28 kg/m²             Assessment:      1. URI with cough and congestion         Plan:    No orders of the defined types were placed in this encounter. No Follow-up on file. No orders of the defined types were placed in this encounter. Orders Placed This Encounter   Medications    azithromycin (ZITHROMAX) 250 MG tablet     Sig: Take 2 tabs (500 mg) on Day 1, and take 1 tab (250 mg) on days 2 through 5. Dispense:  1 packet     Refill:  0    promethazine-dextromethorphan (PROMETHAZINE-DM) 6.25-15 MG/5ML syrup     Sig: Take 5 mLs by mouth 4 times daily as needed for Cough     Dispense:  1 Bottle     Refill:  0       Patient given educational materials - see patient instructions. Discussed use, benefit, and side effects of prescribed medications. All patient questions answered. Pt voiced understanding. Reviewed health maintenance. Instructed to continue current medications, diet and exercise. Patient agreed with treatment plan. Follow up as directed. Patient Instructions     Patient Education        Upper Respiratory Infection (Cold): Care Instructions  Your Care Instructions    An upper respiratory infection, or URI, is an infection of the nose, sinuses, or throat. URIs are spread by coughs, sneezes, and direct contact. The common cold is the most frequent kind of URI. The flu and sinus infections are other kinds of URIs. Almost all URIs are caused by viruses. Antibiotics won't cure them. But you can treat most infections with home care. This may include drinking lots of fluids and taking over-the-counter pain medicine. You will probably feel better in 4 to 10 days. The doctor has checked you carefully, but problems can develop later. If you notice any problems or new symptoms, get medical treatment right away. Follow-up care is a key part of your treatment and safety. Be sure to make and go to all appointments, and call your doctor if you are having problems. It's also a good idea to know your test results and keep a list of the medicines you take. How can you care for yourself at home?   · To prevent dehydration, drink plenty of fluids, enough so that your urine is light yellow or clear like water. Choose water and other caffeine-free clear liquids until you feel better. If you have kidney, heart, or liver disease and have to limit fluids, talk with your doctor before you increase the amount of fluids you drink. · Take an over-the-counter pain medicine, such as acetaminophen (Tylenol), ibuprofen (Advil, Motrin), or naproxen (Aleve). Read and follow all instructions on the label. · Before you use cough and cold medicines, check the label. These medicines may not be safe for young children or for people with certain health problems. · Be careful when taking over-the-counter cold or flu medicines and Tylenol at the same time. Many of these medicines have acetaminophen, which is Tylenol. Read the labels to make sure that you are not taking more than the recommended dose. Too much acetaminophen (Tylenol) can be harmful. · Get plenty of rest.  · Do not smoke or allow others to smoke around you. If you need help quitting, talk to your doctor about stop-smoking programs and medicines. These can increase your chances of quitting for good. When should you call for help? Call 911 anytime you think you may need emergency care. For example, call if:  ? · You have severe trouble breathing. ?Call your doctor now or seek immediate medical care if:  ? · You seem to be getting much sicker. ? · You have new or worse trouble breathing. ? · You have a new or higher fever. ? · You have a new rash. ? Watch closely for changes in your health, and be sure to contact your doctor if:  ? · You have a new symptom, such as a sore throat, an earache, or sinus pain. ? · You cough more deeply or more often, especially if you notice more mucus or a change in the color of your mucus. ? · You do not get better as expected. Where can you learn more? Go to https://jass.[employer]. org and sign in to your SLEDVision account.  Enter B459 in the Search Health Information box to learn more about \"Upper Respiratory Infection (Cold): Care Instructions. \"     If you do not have an account, please click on the \"Sign Up Now\" link. Current as of: May 12, 2017  Content Version: 11.4  © 3777-7340 Healthwise, Incorporated. Care instructions adapted under license by [facility] ([facility]). If you have questions about a medical condition or this instruction, always ask your healthcare professional. [address] any warranty or liability for your use of this information.

## 2018-02-06 ENCOUNTER — HOSPITAL ENCOUNTER (OUTPATIENT)
Dept: VASCULAR LAB | Age: 43
Discharge: HOME | End: 2018-02-06
Payer: COMMERCIAL

## 2018-02-06 DIAGNOSIS — M79.661 PAIN IN RIGHT LOWER LEG: Primary | ICD-10-CM

## 2018-02-06 PROCEDURE — 93971 EXTREMITY STUDY: CPT

## 2018-05-12 ENCOUNTER — OFFICE VISIT (OUTPATIENT)
Dept: URGENT CARE | Age: 43
End: 2018-05-12
Payer: COMMERCIAL

## 2018-05-12 VITALS
SYSTOLIC BLOOD PRESSURE: 125 MMHG | HEIGHT: 72 IN | BODY MASS INDEX: 36.03 KG/M2 | DIASTOLIC BLOOD PRESSURE: 94 MMHG | HEART RATE: 64 BPM | RESPIRATION RATE: 14 BRPM | TEMPERATURE: 98.3 F | OXYGEN SATURATION: 97 % | WEIGHT: 266 LBS

## 2018-05-12 DIAGNOSIS — L25.5 RHUS DERMATITIS: Primary | ICD-10-CM

## 2018-05-12 PROCEDURE — G8427 DOCREV CUR MEDS BY ELIG CLIN: HCPCS | Performed by: FAMILY MEDICINE

## 2018-05-12 PROCEDURE — G8417 CALC BMI ABV UP PARAM F/U: HCPCS | Performed by: FAMILY MEDICINE

## 2018-05-12 PROCEDURE — 99213 OFFICE O/P EST LOW 20 MIN: CPT | Performed by: FAMILY MEDICINE

## 2018-05-12 PROCEDURE — 96372 THER/PROPH/DIAG INJ SC/IM: CPT | Performed by: FAMILY MEDICINE

## 2018-05-12 PROCEDURE — 1036F TOBACCO NON-USER: CPT | Performed by: FAMILY MEDICINE

## 2018-05-12 RX ORDER — METHYLPREDNISOLONE ACETATE 80 MG/ML
80 INJECTION, SUSPENSION INTRA-ARTICULAR; INTRALESIONAL; INTRAMUSCULAR; SOFT TISSUE ONCE
Status: COMPLETED | OUTPATIENT
Start: 2018-05-12 | End: 2018-05-12

## 2018-05-12 RX ORDER — PREDNISONE 10 MG/1
TABLET ORAL
Qty: 9 TABLET | Refills: 0 | Status: SHIPPED | OUTPATIENT
Start: 2018-05-12 | End: 2018-10-20 | Stop reason: ALTCHOICE

## 2018-05-12 RX ORDER — DIAPER,BRIEF,INFANT-TODD,DISP
EACH MISCELLANEOUS 2 TIMES DAILY
COMMUNITY
End: 2018-10-31 | Stop reason: ALTCHOICE

## 2018-05-12 RX ORDER — DIPHENHYDRAMINE HCL 25 MG
25 CAPSULE ORAL EVERY 6 HOURS PRN
COMMUNITY

## 2018-05-12 RX ADMIN — METHYLPREDNISOLONE ACETATE 80 MG: 80 INJECTION, SUSPENSION INTRA-ARTICULAR; INTRALESIONAL; INTRAMUSCULAR; SOFT TISSUE at 13:11

## 2018-05-12 ASSESSMENT — ENCOUNTER SYMPTOMS
SHORTNESS OF BREATH: 0
EYE PAIN: 0
RHINORRHEA: 0
COUGH: 0
SORE THROAT: 0

## 2018-10-07 ENCOUNTER — OFFICE VISIT (OUTPATIENT)
Dept: URGENT CARE | Age: 43
End: 2018-10-07
Payer: COMMERCIAL

## 2018-10-07 VITALS
BODY MASS INDEX: 37.11 KG/M2 | SYSTOLIC BLOOD PRESSURE: 123 MMHG | DIASTOLIC BLOOD PRESSURE: 86 MMHG | WEIGHT: 274 LBS | RESPIRATION RATE: 20 BRPM | OXYGEN SATURATION: 96 % | HEIGHT: 72 IN | HEART RATE: 90 BPM | TEMPERATURE: 98.2 F

## 2018-10-07 DIAGNOSIS — R05.8 COUGH PRODUCTIVE OF PURULENT SPUTUM: ICD-10-CM

## 2018-10-07 DIAGNOSIS — J40 BRONCHITIS: ICD-10-CM

## 2018-10-07 DIAGNOSIS — R50.9 FEVER, UNSPECIFIED FEVER CAUSE: ICD-10-CM

## 2018-10-07 DIAGNOSIS — H66.93 BILATERAL OTITIS MEDIA, UNSPECIFIED OTITIS MEDIA TYPE: Primary | ICD-10-CM

## 2018-10-07 PROCEDURE — 1036F TOBACCO NON-USER: CPT | Performed by: SPECIALIST

## 2018-10-07 PROCEDURE — 87880 STREP A ASSAY W/OPTIC: CPT | Performed by: SPECIALIST

## 2018-10-07 PROCEDURE — 87804 INFLUENZA ASSAY W/OPTIC: CPT | Performed by: SPECIALIST

## 2018-10-07 PROCEDURE — 99213 OFFICE O/P EST LOW 20 MIN: CPT | Performed by: SPECIALIST

## 2018-10-07 PROCEDURE — G8484 FLU IMMUNIZE NO ADMIN: HCPCS | Performed by: SPECIALIST

## 2018-10-07 PROCEDURE — G8417 CALC BMI ABV UP PARAM F/U: HCPCS | Performed by: SPECIALIST

## 2018-10-07 PROCEDURE — G8427 DOCREV CUR MEDS BY ELIG CLIN: HCPCS | Performed by: SPECIALIST

## 2018-10-07 RX ORDER — AMOXICILLIN 875 MG/1
875 TABLET, COATED ORAL 2 TIMES DAILY
Qty: 20 TABLET | Refills: 0 | Status: SHIPPED | OUTPATIENT
Start: 2018-10-07 | End: 2018-10-17

## 2018-10-07 ASSESSMENT — ENCOUNTER SYMPTOMS
SORE THROAT: 1
COUGH: 1

## 2018-10-07 NOTE — PATIENT INSTRUCTIONS
instruction, always ask your healthcare professional. [place] [address] any warranty or liability for your use of this information. Patient Education        Ear Infection (Otitis Media): Care Instructions  Your Care Instructions    An ear infection may start with a cold and affect the middle ear (otitis media). It can hurt a lot. Most ear infections clear up on their own in a couple of days. Most often you will not need antibiotics. This is because many ear infections are caused by a virus. Antibiotics don't work against a virus. Regular doses of pain medicines are the best way to reduce your fever and help you feel better. Follow-up care is a key part of your treatment and safety. Be sure to make and go to all appointments, and call your doctor if you are having problems. It's also a good idea to know your test results and keep a list of the medicines you take. How can you care for yourself at home? · Take pain medicines exactly as directed. ¨ If the doctor gave you a prescription medicine for pain, take it as prescribed. ¨ If you are not taking a prescription pain medicine, take an over-the-counter medicine, such as acetaminophen (Tylenol), ibuprofen (Advil, Motrin), or naproxen (Aleve). Read and follow all instructions on the label. ¨ Do not take two or more pain medicines at the same time unless the doctor told you to. Many pain medicines have acetaminophen, which is Tylenol. Too much acetaminophen (Tylenol) can be harmful. · Plan to take a full dose of pain reliever before bedtime. Getting enough sleep will help you get better. · Try a warm, moist washcloth on the ear. It may help relieve pain. · If your doctor prescribed antibiotics, take them as directed. Do not stop taking them just because you feel better. You need to take the full course of antibiotics. When should you call for help?   Call your doctor now or seek immediate medical care if:    · You have new or increasing

## 2018-10-08 LAB
INFLUENZA A ANTIBODY: NEGATIVE
INFLUENZA B ANTIBODY: NEGATIVE
S PYO AG THROAT QL: NORMAL

## 2018-10-20 ENCOUNTER — OFFICE VISIT (OUTPATIENT)
Dept: URGENT CARE | Age: 43
End: 2018-10-20
Payer: COMMERCIAL

## 2018-10-20 VITALS
RESPIRATION RATE: 18 BRPM | DIASTOLIC BLOOD PRESSURE: 90 MMHG | WEIGHT: 280 LBS | TEMPERATURE: 97.6 F | HEART RATE: 78 BPM | SYSTOLIC BLOOD PRESSURE: 142 MMHG | OXYGEN SATURATION: 96 % | HEIGHT: 72 IN | BODY MASS INDEX: 37.93 KG/M2

## 2018-10-20 DIAGNOSIS — L02.414 ABSCESS OF LEFT ARM: Primary | ICD-10-CM

## 2018-10-20 PROCEDURE — 99213 OFFICE O/P EST LOW 20 MIN: CPT | Performed by: PHYSICIAN ASSISTANT

## 2018-10-20 PROCEDURE — G8417 CALC BMI ABV UP PARAM F/U: HCPCS | Performed by: PHYSICIAN ASSISTANT

## 2018-10-20 PROCEDURE — G8427 DOCREV CUR MEDS BY ELIG CLIN: HCPCS | Performed by: PHYSICIAN ASSISTANT

## 2018-10-20 PROCEDURE — G8484 FLU IMMUNIZE NO ADMIN: HCPCS | Performed by: PHYSICIAN ASSISTANT

## 2018-10-20 PROCEDURE — 1036F TOBACCO NON-USER: CPT | Performed by: PHYSICIAN ASSISTANT

## 2018-10-20 RX ORDER — SULFAMETHOXAZOLE AND TRIMETHOPRIM 800; 160 MG/1; MG/1
1 TABLET ORAL 2 TIMES DAILY
Qty: 20 TABLET | Refills: 0 | Status: SHIPPED | OUTPATIENT
Start: 2018-10-20 | End: 2018-10-30

## 2018-10-20 RX ORDER — KETOROLAC TROMETHAMINE 10 MG/1
10 TABLET, FILM COATED ORAL EVERY 6 HOURS PRN
Qty: 20 TABLET | Refills: 0 | Status: SHIPPED | OUTPATIENT
Start: 2018-10-20 | End: 2019-10-20

## 2018-10-20 ASSESSMENT — ENCOUNTER SYMPTOMS
ALLERGIC/IMMUNOLOGIC NEGATIVE: 1
GASTROINTESTINAL NEGATIVE: 1
RESPIRATORY NEGATIVE: 1
EYES NEGATIVE: 1

## 2018-10-20 NOTE — PROGRESS NOTES
Allergies   Allergen Reactions    Cetirizine     Cinnamon     Clindamycin/Lincomycin     Sweetness Enhancer     Triamcinolone Acetonide     Zyrtec Allergy [Cetirizine Hcl]     Botox [Botulinum Toxin Type A]      Had botox injections in neck. Caused her throat to become very dry and was hard to swallow. Health Maintenance   Topic Date Due    HIV screen  01/27/1989    DTaP/Tdap/Td vaccine (1 - Tdap) 01/27/1993    Cervical cancer screen  01/27/1995    Lipid screen  01/27/2014    Diabetes screen  01/27/2014    Flu vaccine (1) 09/01/2018       Subjective:     Review of Systems   Constitutional: Negative for chills and fever. HENT: Negative. Eyes: Negative. Respiratory: Negative. Cardiovascular: Negative. Gastrointestinal: Negative. Endocrine: Negative. Genitourinary: Negative. Musculoskeletal: Negative. Skin: Positive for wound. Left arm abscess. Allergic/Immunologic: Negative. All others negative      Objective:     Physical Exam   Constitutional: No distress. HENT:   Mouth/Throat: No oropharyngeal exudate. Eyes: Left eye exhibits no discharge. No scleral icterus. Neck: No JVD present. No tracheal deviation present. No thyromegaly present. Cardiovascular: Exam reveals no gallop and no friction rub. Pulmonary/Chest: No stridor. She has no rales. Abdominal: She exhibits no distension and no mass. There is no rebound and no guarding. Musculoskeletal: She exhibits no edema. Neurological: No cranial nerve deficit. She exhibits normal muscle tone. Coordination normal.   Skin: She is not diaphoretic. No pallor. BP (!) 142/90   Pulse 78   Temp 97.6 °F (36.4 °C) (Temporal)   Resp 18   Ht 6' (1.829 m)   Wt 280 lb (127 kg)   LMP 08/07/2018   SpO2 96%   BMI 37.97 kg/m²         Assessment:       Diagnosis Orders   1. Abscess of left arm  Wound Culture     Procedure: The left arm was prepped using sterile technique.  The area was cleansed with Betadine swabs. I injected 5 cc with lidocaine with epi. Then using # 11 scapel I made 1/4 inch incision. I then tried to express pus. I saw some scan drainage but mostly blood. I swabbed for wound culture. I then packed the wound with 1/4 inch packing tape. Bleeding was controlled and bandage was applied. Plan:      Orders Placed This Encounter   Procedures    Wound Culture     Left arm       Return in about 2 years (around 10/20/2020), or Urgent Care. Orders Placed This Encounter   Procedures    Wound Culture     Left arm     Orders Placed This Encounter   Medications    ketorolac (TORADOL) 10 MG tablet     Sig: Take 1 tablet by mouth every 6 hours as needed for Pain     Dispense:  20 tablet     Refill:  0    sulfamethoxazole-trimethoprim (BACTRIM DS) 800-160 MG per tablet     Sig: Take 1 tablet by mouth 2 times daily for 10 days     Dispense:  20 tablet     Refill:  0       Patient given educationalmaterials - see patient instructions. Discussed use, benefit, and side effectsof prescribed medications. All patient questions answered. Pt voiced understanding. Reviewed health maintenance. Instructed to continue current medications, diet andexercise. Patient agreed with treatment plan. Follow up as directed. Patient Instructions     Patient Education        Skin Abscess: Care Instructions  Your Care Instructions    A skin abscess is a bacterial infection that forms a pocket of pus. A boil is a kind of skin abscess. The doctor may have cut an opening in the abscess so that the pus can drain out. You may have gauze in the cut so that the abscess will stay open and keep draining. You may need antibiotics. You will need to follow up with your doctor to make sure the infection has gone away. The doctor has checked you carefully, but problems can develop later. If you notice any problems or new symptoms, get medical treatment right away.   Follow-up care is a key part of your treatment and

## 2018-10-22 ENCOUNTER — OFFICE VISIT (OUTPATIENT)
Dept: URGENT CARE | Age: 43
End: 2018-10-22
Payer: COMMERCIAL

## 2018-10-22 VITALS
BODY MASS INDEX: 36.62 KG/M2 | SYSTOLIC BLOOD PRESSURE: 137 MMHG | OXYGEN SATURATION: 98 % | DIASTOLIC BLOOD PRESSURE: 88 MMHG | WEIGHT: 270 LBS | HEART RATE: 70 BPM | RESPIRATION RATE: 18 BRPM | TEMPERATURE: 97 F

## 2018-10-22 DIAGNOSIS — L02.91 ABSCESS: Primary | ICD-10-CM

## 2018-10-22 PROCEDURE — G8427 DOCREV CUR MEDS BY ELIG CLIN: HCPCS | Performed by: NURSE PRACTITIONER

## 2018-10-22 PROCEDURE — 1036F TOBACCO NON-USER: CPT | Performed by: NURSE PRACTITIONER

## 2018-10-22 PROCEDURE — G8417 CALC BMI ABV UP PARAM F/U: HCPCS | Performed by: NURSE PRACTITIONER

## 2018-10-22 PROCEDURE — 99213 OFFICE O/P EST LOW 20 MIN: CPT | Performed by: NURSE PRACTITIONER

## 2018-10-22 PROCEDURE — G8484 FLU IMMUNIZE NO ADMIN: HCPCS | Performed by: NURSE PRACTITIONER

## 2018-10-22 ASSESSMENT — ENCOUNTER SYMPTOMS
RESPIRATORY NEGATIVE: 1
COLOR CHANGE: 1
BACK PAIN: 0

## 2018-10-23 ENCOUNTER — OFFICE VISIT (OUTPATIENT)
Dept: URGENT CARE | Age: 43
End: 2018-10-23
Payer: COMMERCIAL

## 2018-10-23 VITALS
WEIGHT: 270 LBS | TEMPERATURE: 97.6 F | RESPIRATION RATE: 18 BRPM | HEIGHT: 72 IN | OXYGEN SATURATION: 98 % | SYSTOLIC BLOOD PRESSURE: 123 MMHG | DIASTOLIC BLOOD PRESSURE: 87 MMHG | BODY MASS INDEX: 36.57 KG/M2 | HEART RATE: 85 BPM

## 2018-10-23 DIAGNOSIS — L03.114 CELLULITIS OF LEFT UPPER EXTREMITY: Primary | ICD-10-CM

## 2018-10-23 PROCEDURE — 99213 OFFICE O/P EST LOW 20 MIN: CPT | Performed by: NURSE PRACTITIONER

## 2018-10-23 PROCEDURE — G8484 FLU IMMUNIZE NO ADMIN: HCPCS | Performed by: NURSE PRACTITIONER

## 2018-10-23 PROCEDURE — G8417 CALC BMI ABV UP PARAM F/U: HCPCS | Performed by: NURSE PRACTITIONER

## 2018-10-23 PROCEDURE — 1036F TOBACCO NON-USER: CPT | Performed by: NURSE PRACTITIONER

## 2018-10-23 PROCEDURE — G8427 DOCREV CUR MEDS BY ELIG CLIN: HCPCS | Performed by: NURSE PRACTITIONER

## 2018-10-23 RX ORDER — CEPHALEXIN 500 MG/1
500 CAPSULE ORAL 4 TIMES DAILY
Qty: 40 CAPSULE | Refills: 0 | Status: SHIPPED | OUTPATIENT
Start: 2018-10-23 | End: 2018-11-02

## 2018-10-23 ASSESSMENT — ENCOUNTER SYMPTOMS
VOMITING: 0
COUGH: 0
SORE THROAT: 0
DIARRHEA: 0
ABDOMINAL PAIN: 0
NAUSEA: 0
SHORTNESS OF BREATH: 0
RHINORRHEA: 0

## 2018-10-24 ENCOUNTER — TELEPHONE (OUTPATIENT)
Dept: URGENT CARE | Age: 43
End: 2018-10-24

## 2018-10-24 LAB
GRAM STAIN RESULT: NORMAL
WOUND/ABSCESS: NORMAL

## 2018-10-24 NOTE — PATIENT INSTRUCTIONS
scrapes, or other injuries to your skin. Cellulitis most often occurs where there is a break in the skin. · If you get a scrape, cut, mild burn, or bite, wash the wound with clean water as soon as you can to help avoid infection. Don't use hydrogen peroxide or alcohol, which can slow healing. · If you have swelling in your legs (edema), support stockings and good skin care may help prevent leg sores and cellulitis. · Take care of your feet, especially if you have diabetes or other conditions that increase the risk of infection. Wear shoes and socks. Do not go barefoot. If you have athlete's foot or other skin problems on your feet, talk to your doctor about how to treat them. When should you call for help? Call your doctor now or seek immediate medical care if:    · You have signs that your infection is getting worse, such as:  ¨ Increased pain, swelling, warmth, or redness. ¨ Red streaks leading from the area. ¨ Pus draining from the area. ¨ A fever.     · You get a rash.    Watch closely for changes in your health, and be sure to contact your doctor if:    · You do not get better as expected. Where can you learn more? Go to https://Embedly.[employer] Publishing. org and sign in to your [employer]Nation account. Enter B535 in the [facility] box to learn more about \"Cellulitis: Care Instructions. \"     If you do not have an account, please click on the \"Sign Up Now\" link. Current as of: May 10, 2017  Content Version: 11.7  © 0127-3839 Patient Education Systems, [employer]ID. Care instructions adapted under license by [facility] ([facility]). If you have questions about a medical condition or this instruction, always ask your healthcare professional. [place] [address] any warranty or liability for your use of this information. 1. Continue bactrim and start keflex. 2. Antibiotic ointment 2-3 times daily  3. Keep site covered  4. If worsens go to ER  5.  Follow up with PCP as needed

## 2018-10-24 NOTE — PROGRESS NOTES
[address] [facility] CARE  [address] [address]  Dept: [phone]  Loc: [phone]    Mirian Berrios is a 39 y.o. female who presents today for her medical conditions/complaintsas noted below. Mirian Berrios is c/o of Follow-up (wound check )        HPI:     HPI  Patient here for wound check. Had I&D Saturday. No final wound culture results back yet. Patient on bactrim and taking as prescribed. Redness and firmness still present. No fever. Packing came out on own. No drainage. Past Medical History:   Diagnosis Date    Chronic back pain     H. pylori infection 11/2017    Osteoarthritis      Past Surgical History:   Procedure Laterality Date    SPINE SURGERY      TUBAL LIGATION         History reviewed. No pertinent family history. Social History   Substance Use Topics    Smoking status: Never Smoker    Smokeless tobacco: Never Used    Alcohol use No      Current Outpatient Prescriptions   Medication Sig Dispense Refill    cephALEXin (KEFLEX) 500 MG capsule Take 1 capsule by mouth 4 times daily for 10 days 40 capsule 0    mupirocin (BACTROBAN) 2 % ointment Apply 3 times daily. 1 Tube 0    ketorolac (TORADOL) 10 MG tablet Take 1 tablet by mouth every 6 hours as needed for Pain 20 tablet 0    sulfamethoxazole-trimethoprim (BACTRIM DS) 800-160 MG per tablet Take 1 tablet by mouth 2 times daily for 10 days 20 tablet 0    diphenhydrAMINE (BENADRYL) 25 MG capsule Take 25 mg by mouth every 6 hours as needed for Itching      hydrocortisone 1 % cream Apply topically 2 times daily Apply topically 2 times daily.       Bismuth 262 MG CHEW Take 262 mg by mouth      tetracycline (ACHROMYCIN;SUMYCIN) 500 MG capsule Take 500 mg by mouth 4 times daily      omeprazole (PRILOSEC) 20 MG delayed release capsule Take 20 mg by mouth 2 times daily       Current Facility-Administered Medications   Medication Dose Route Frequency Provider Last Rate Last if needed, to look for a specific bacteria, like methicillin-resistant Staphylococcus aureus (MRSA). The doctor has checked you carefully, but problems can develop later. If you notice any problems or new symptoms, get medical treatment right away. Follow-up care is a key part of your treatment and safety. Be sure to make and go to all appointments, and call your doctor if you are having problems. It's also a good idea to know your test results and keep a list of the medicines you take. How can you care for yourself at home? · Take your antibiotics as directed. Do not stop taking them just because you feel better. You need to take the full course of antibiotics. · Prop up the infected area on pillows to reduce pain and swelling. Try to keep the area above the level of your heart as often as you can. · If your doctor told you how to care for your wound, follow your doctor's instructions. If you did not get instructions, follow this general advice:  ¨ Wash the wound with clean water 2 times a day. Don't use hydrogen peroxide or alcohol, which can slow healing. ¨ You may cover the wound with a thin layer of petroleum jelly, such as Vaseline, and a nonstick bandage. ¨ Apply more petroleum jelly and replace the bandage as needed. · Be safe with medicines. Take pain medicines exactly as directed. ¨ If the doctor gave you a prescription medicine for pain, take it as prescribed. ¨ If you are not taking a prescription pain medicine, ask your doctor if you can take an over-the-counter medicine. To prevent cellulitis in the future  · Try to prevent cuts, scrapes, or other injuries to your skin. Cellulitis most often occurs where there is a break in the skin. · If you get a scrape, cut, mild burn, or bite, wash the wound with clean water as soon as you can to help avoid infection. Don't use hydrogen peroxide or alcohol, which can slow healing.   · If you have swelling in your legs (edema), support stockings and good

## 2018-10-30 ENCOUNTER — HOSPITAL ENCOUNTER (EMERGENCY)
Age: 43
Discharge: LEFT BEFORE DISCHARGE | End: 2018-10-30
Payer: COMMERCIAL

## 2018-10-30 ENCOUNTER — OFFICE VISIT (OUTPATIENT)
Dept: URGENT CARE | Age: 43
End: 2018-10-30
Payer: COMMERCIAL

## 2018-10-30 VITALS
DIASTOLIC BLOOD PRESSURE: 94 MMHG | TEMPERATURE: 97.4 F | WEIGHT: 270 LBS | HEIGHT: 72 IN | SYSTOLIC BLOOD PRESSURE: 138 MMHG | OXYGEN SATURATION: 97 % | HEART RATE: 90 BPM | BODY MASS INDEX: 36.57 KG/M2 | RESPIRATION RATE: 18 BRPM

## 2018-10-30 VITALS
DIASTOLIC BLOOD PRESSURE: 97 MMHG | OXYGEN SATURATION: 98 % | RESPIRATION RATE: 20 BRPM | TEMPERATURE: 98.6 F | SYSTOLIC BLOOD PRESSURE: 132 MMHG | HEART RATE: 92 BPM

## 2018-10-30 DIAGNOSIS — L02.91 ABSCESS: Primary | ICD-10-CM

## 2018-10-30 DIAGNOSIS — L03.114 CELLULITIS OF LEFT UPPER EXTREMITY: ICD-10-CM

## 2018-10-30 PROCEDURE — G8417 CALC BMI ABV UP PARAM F/U: HCPCS | Performed by: NURSE PRACTITIONER

## 2018-10-30 PROCEDURE — G8427 DOCREV CUR MEDS BY ELIG CLIN: HCPCS | Performed by: NURSE PRACTITIONER

## 2018-10-30 PROCEDURE — 4500000002 HC ER NO CHARGE

## 2018-10-30 PROCEDURE — G8484 FLU IMMUNIZE NO ADMIN: HCPCS | Performed by: NURSE PRACTITIONER

## 2018-10-30 PROCEDURE — 99212 OFFICE O/P EST SF 10 MIN: CPT | Performed by: NURSE PRACTITIONER

## 2018-10-30 PROCEDURE — 1036F TOBACCO NON-USER: CPT | Performed by: NURSE PRACTITIONER

## 2018-10-30 ASSESSMENT — ENCOUNTER SYMPTOMS
VOMITING: 0
ABDOMINAL PAIN: 0
COUGH: 0
DIARRHEA: 0
RHINORRHEA: 0
SHORTNESS OF BREATH: 0
NAUSEA: 0
SORE THROAT: 0

## 2018-10-30 ASSESSMENT — PAIN DESCRIPTION - PAIN TYPE: TYPE: ACUTE PAIN

## 2018-10-30 ASSESSMENT — PAIN DESCRIPTION - ORIENTATION: ORIENTATION: LEFT

## 2018-10-30 ASSESSMENT — PAIN SCALES - GENERAL: PAINLEVEL_OUTOF10: 7

## 2018-10-30 ASSESSMENT — PAIN DESCRIPTION - DESCRIPTORS: DESCRIPTORS: ACHING

## 2018-10-30 ASSESSMENT — PAIN DESCRIPTION - LOCATION: LOCATION: ARM

## 2018-10-30 NOTE — PATIENT INSTRUCTIONS
call for help? Call your doctor now or seek immediate medical care if:    · You have signs of worsening infection, such as:  ¨ Increased pain, swelling, warmth, or redness. ¨ Red streaks leading from the infected skin. ¨ Pus draining from the wound. ¨ A fever.    Watch closely for changes in your health, and be sure to contact your doctor if:    · You do not get better as expected. Where can you learn more? Go to https://3C PluspeAffinity Chinaeb.[employer]. org and sign in to your [employer] account. Enter R489 in the VeriTweet box to learn more about \"Skin Abscess: Care Instructions. \"     If you do not have an account, please click on the \"Sign Up Now\" link. Current as of: May 10, 2017  Content Version: 11.7  © 4222-2392 [employer], Incorporated. Care instructions adapted under license by [facility] ([facility]). If you have questions about a medical condition or this instruction, always ask your healthcare professional. [place] [address] any warranty or liability for your use of this information. 1. Finish out antibiotic  2. General surgery consult   3.  Follow up with PCP as needed

## 2018-10-30 NOTE — PROGRESS NOTES
capsule Take 20 mg by mouth 2 times daily       Current Facility-Administered Medications   Medication Dose Route Frequency Provider Last Rate Last Dose    triamcinolone acetonide (KENALOG-40) injection 40 mg  40 mg Intramuscular Once Eb Fry PA-C         Allergies   Allergen Reactions    Cetirizine     Cinnamon     Clindamycin/Lincomycin     Sweetness Enhancer     Triamcinolone Acetonide     Zyrtec Allergy [Cetirizine Hcl]     Botox [Botulinum Toxin Type A]      Had botox injections in neck. Caused her throat to become very dry and was hard to swallow. Health Maintenance   Topic Date Due    HIV screen  01/27/1989    DTaP/Tdap/Td vaccine (1 - Tdap) 01/27/1993    Cervical cancer screen  01/27/1995    Lipid screen  01/27/2014    Diabetes screen  01/27/2014    Flu vaccine (1) 09/01/2018       Subjective:     Review of Systems   Constitutional: Negative for activity change, appetite change, chills, diaphoresis, fatigue and fever. HENT: Negative for congestion, ear pain, rhinorrhea and sore throat. Respiratory: Negative for cough and shortness of breath. Gastrointestinal: Negative for abdominal pain, diarrhea, nausea and vomiting. Skin: Positive for wound. Negative for rash. Neurological: Negative for headaches. All other systems reviewed and are negative. Objective:     Physical Exam   Skin: Skin is warm. BP (!) 138/94   Pulse 90   Temp 97.4 °F (36.3 °C)   Resp 18   Ht 6' (1.829 m)   Wt 270 lb (122.5 kg)   LMP 08/07/2018   SpO2 97%   BMI 36.62 kg/m²     Assessment:       Diagnosis Orders   1. Abscess  [facility] General Surgery- Josh Olvera MD   2. Cellulitis of left upper extremity  413 Michelle Do MD       Plan:   1. Finish out antibiotic  2. General surgery consult   3.  Follow up with PCP as needed   Orders Placed This Encounter   Procedures   413 Michelle Do MD     Referral Priority:   Routine     Referral Type:   Eval and Treat     Referral Reason:   Specialty Services Required     Referred to Provider:   No Stroud DO     Requested Specialty:   General Surgery     Number of Visits Requested:   1       No Follow-up on file. No orders of the defined types were placed in this encounter. Patient given educational materials- see patient instructions. Discussed use, benefit, and side effects of prescribedmedications. All patient questions answered. Pt voiced understanding. Reviewedhealth maintenance. Instructed to continue current medications, diet and exercise. Patient agreed with treatment plan. Follow up as directed. Patient Instructions       Patient Education        Skin Abscess: Care Instructions  Your Care Instructions    A skin abscess is a bacterial infection that forms a pocket of pus. A boil is a kind of skin abscess. The doctor may have cut an opening in the abscess so that the pus can drain out. You may have gauze in the cut so that the abscess will stay open and keep draining. You may need antibiotics. You will need to follow up with your doctor to make sure the infection has gone away. The doctor has checked you carefully, but problems can develop later. If you notice any problems or new symptoms, get medical treatment right away. Follow-up care is a key part of your treatment and safety. Be sure to make and go to all appointments, and call your doctor if you are having problems. It's also a good idea to know your test results and keep a list of the medicines you take. How can you care for yourself at home? · Apply warm and dry compresses, a heating pad set on low, or a hot water bottle 3 or 4 times a day for pain. Keep a cloth between the heat source and your skin. · If your doctor prescribed antibiotics, take them as directed. Do not stop taking them just because you feel better. You need to take the full course of antibiotics. · Take pain medicines exactly as directed.   ¨ If the

## 2018-10-31 ENCOUNTER — OFFICE VISIT (OUTPATIENT)
Dept: SURGERY | Age: 43
End: 2018-10-31
Payer: COMMERCIAL

## 2018-10-31 VITALS
WEIGHT: 274.8 LBS | DIASTOLIC BLOOD PRESSURE: 78 MMHG | SYSTOLIC BLOOD PRESSURE: 124 MMHG | TEMPERATURE: 98 F | BODY MASS INDEX: 37.22 KG/M2 | HEIGHT: 72 IN

## 2018-10-31 DIAGNOSIS — L03.114 CELLULITIS OF LEFT UPPER EXTREMITY: ICD-10-CM

## 2018-10-31 PROCEDURE — 99203 OFFICE O/P NEW LOW 30 MIN: CPT | Performed by: SURGERY

## 2018-10-31 RX ORDER — DOXYCYCLINE HYCLATE 100 MG
100 TABLET ORAL 2 TIMES DAILY
Qty: 20 TABLET | Refills: 0 | Status: SHIPPED | OUTPATIENT
Start: 2018-10-31 | End: 2018-11-10

## 2018-10-31 ASSESSMENT — ENCOUNTER SYMPTOMS
SORE THROAT: 0
CHEST TIGHTNESS: 0
DIARRHEA: 0
COUGH: 0
EYE REDNESS: 0
ABDOMINAL DISTENTION: 0
CONSTIPATION: 0
NAUSEA: 0
COLOR CHANGE: 0
EYE PAIN: 0
ABDOMINAL PAIN: 0
BACK PAIN: 0
VOMITING: 0
SHORTNESS OF BREATH: 0

## 2020-07-13 ENCOUNTER — OFFICE VISIT (OUTPATIENT)
Age: 45
End: 2020-07-13

## 2020-07-13 VITALS
HEART RATE: 92 BPM | OXYGEN SATURATION: 98 % | RESPIRATION RATE: 18 BRPM | SYSTOLIC BLOOD PRESSURE: 136 MMHG | DIASTOLIC BLOOD PRESSURE: 88 MMHG | TEMPERATURE: 97.7 F

## 2020-07-13 PROCEDURE — 99213 OFFICE O/P EST LOW 20 MIN: CPT | Performed by: NURSE PRACTITIONER

## 2020-07-13 ASSESSMENT — ENCOUNTER SYMPTOMS
SORE THROAT: 0
SHORTNESS OF BREATH: 0
NAUSEA: 0
SINUS PRESSURE: 0
VOMITING: 0
COUGH: 0
DIARRHEA: 1
EYE PAIN: 1
ABDOMINAL PAIN: 0

## 2020-07-13 ASSESSMENT — VISUAL ACUITY: OU: 1

## 2020-07-13 NOTE — PATIENT INSTRUCTIONS
Plenty of fluids  Rest  OTC Tylenol  as needed  Work note for 14 days  Must stay quarantined until test results are returned  Follow up with PCP or return to Flu clinic for worsening or unresolved symptoms. Patient Education        Learning About Coronavirus [phone])  Coronavirus [phone]): Overview  What is coronavirus ([id number])? The coronavirus disease (COVID-19) is caused by a virus. It is an illness that was first found in [place], [place], in December 2019. It has since spread worldwide. The virus can cause fever, cough, and trouble breathing. In severe cases, it can cause pneumonia and make it hard to breathe without help. It can cause death. Coronaviruses are a large group of viruses. They cause the common cold. They also cause more serious illnesses like [place] East respiratory syndrome (MERS) and severe acute respiratory syndrome (SARS). COVID-19 is caused by a novel coronavirus. That means it's a new type that has not been seen in people before. This virus spreads person-to-person through droplets from coughing and sneezing. It can also spread when you are close to someone who is infected. And it can spread when you touch something that has the virus on it, such as a doorknob or a tabletop. What can you do to protect yourself from coronavirus (COVID-19)? The best way to protect yourself from getting sick is to:  · Avoid areas where there is an outbreak. · Avoid contact with people who may be infected. · Wash your hands often with soap or alcohol-based hand sanitizers. · Avoid crowds and try to stay at least 6 feet away from other people. · Wash your hands often, especially after you cough or sneeze. Use soap and water, and scrub for at least 20 seconds. If soap and water aren't available, use an alcohol-based hand . · Avoid touching your mouth, nose, and eyes. What can you do to avoid spreading the virus to others?   To help avoid spreading the virus to others:  · Cover your mouth virus.  · U.[place]. Centers for Disease Control and Prevention (CDC): The CDC provides updated news about the disease and travel advice. The website also tells you how to prevent the spread of infection. www.cdc.gov  · World Health Organization [facility]): WHO offers information about the virus outbreaks. WHO also has travel advice. www.who.int  Current as of: May 8, 2020               Content Version: 12.5  © 2006-2020 [employer]. Care instructions adapted under license by [facility] ([facility]). If you have questions about a medical condition or this instruction, always ask your healthcare professional. [place] [address] any warranty or liability for your use of this information. Patient Education        Coronavirus ([id number]): Care Instructions  Overview  The coronavirus disease (COVID-19) is caused by a virus. Symptoms may include a fever, a cough, and shortness of breath. It mainly spreads person-to-person through droplets from coughing and sneezing. The virus also can spread when people are in close contact with someone who is infected. Most people have mild symptoms and can take care of themselves at home. If their symptoms get worse, they may need care in a hospital. There is no medicine to fight the virus. It's important to not spread the virus to others. If you have COVID-19, wear a face cover anytime you are around other people. You need to isolate yourself while you are sick. Your doctor or local public health official will tell you when you no longer need to be isolated. Leave your home only if you need to get medical care. Follow-up care is a key part of your treatment and safety. Be sure to make and go to all appointments, and call your doctor if you are having problems. It's also a good idea to know your test results and keep a list of the medicines you take. How can you care for yourself at home? · Get extra rest. It can help you feel better. · Drink plenty of fluids.  This symptoms, such as:  · You have severe trouble breathing. (You can't talk at all.)  · You have constant chest pain or pressure. · You are severely dizzy or lightheaded. · You are confused or can't think clearly. · Your face and lips have a blue color. · You pass out (lose consciousness) or are very hard to wake up. Call your doctor now or seek immediate medical care if:  · You have moderate trouble breathing. (You can't speak a full sentence.)  · You are coughing up blood (more than about 1 teaspoon). · You have signs of low blood pressure. These include feeling lightheaded; being too weak to stand; and having cold, pale, clammy skin. Watch closely for changes in your health, and be sure to contact your doctor if:  · Your symptoms get worse. · You are not getting better as expected. Call before you go to the doctor's office. Follow their instructions. And wear a cloth face cover. Current as of: May 8, 2020               Content Version: 12.5  © 2006-2020 [employer]. Care instructions adapted under license by [facility] ([facility]). If you have questions about a medical condition or this instruction, always ask your healthcare professional. [place] [address] any warranty or liability for your use of this information. Patient Education        Learning About Being High-Risk for COVID-19  Who is at high risk? COVID-19 causes a mild illness in many people who have it. But certain things may increase your risk for more serious illness. These include:  · Being age 72 or older. · Smoking. · Living in a long-term care facility. · Having ongoing serious health issues. Some examples are:  ? Chronic lung disease or asthma. ? Heart problems. ? A weakened immune system. ? Cancer treatment. ? Diabetes. This is not a complete list. If you have a chronic health problem, ask your doctor if you should take extra precautions during the outbreak.   If you are pregnant, it's safest to consider yourself at higher risk. It's not known yet whether COVID-19 is dangerous for you or your baby. But pregnancy increases the risk for serious illness from viruses similar to COVID-19. How do you stay safe? · Stay home. ? Stay home as much as you can. This may be the easiest way to avoid exposure, as long as no one else in your household has the virus. ? If there are a lot of COVID-19 cases in your community, do not leave your home except to seek medical care. ? Limit visitors right now. It's especially important to avoid contact with anyone who is sick or who might have been exposed. Remember that people may have been exposed without knowing it or having any symptoms. ? Have enough food, medicines, and other supplies on hand so that you don't have to go out. Try some of these options if you don't have what you need. ? Use delivery and takeout services for groceries and meals. ? Have a healthy family member, friend, or neighbor shop for you. ? Ask your doctor for extra prescription medicine. ? Routinely clean and disinfect high-touch surfaces. These include countertops, faucets, door handles, doorknobs, and phones. ? Do not travel. · Wash your hands often and well. ? Wash your hands often, especially after you cough or sneeze. Use soap and water, and scrub for at least 20 seconds. If soap and water aren't available, use an alcohol-based hand . · Be extra careful if you have to go out. ? Avoid crowds and crowded places. Try to keep 6 feet of space between yourself and others. ? Don't use public transportation, ride-shares, or taxis unless you have no choice. ? Try not to touch things that many other people have touched. Door handles, elevator buttons, shopping cart handles, and handrails on escalators get a lot of touches. ? Carry tissues or paper towels with you. If you must touch something, you'll be able to protect your hands. ? Don't shake hands with anyone.  Try a friendly wave instead. ? Don't touch your face, and wash your hands often. ? Wash your hands again as soon as you get home. · Know when to call your doctor. ? Call a doctor if you have symptoms of COVID-19 (fever, cough, shortness of breath). If you are told to get testing or care and must go out, wear a cloth face cover. Current as of: May 8, 2020               Content Version: 12.5  © 2006-2020 Healthwise, Incorporated. Care instructions adapted under license by [facility] ([facility]). If you have questions about a medical condition or this instruction, always ask your healthcare professional. [place] [address] any warranty or liability for your use of this information.

## 2020-07-13 NOTE — PROGRESS NOTES
Had botox injections in neck. Caused her throat to become very dry and was hard to swallow. Health Maintenance   Topic Date Due    HIV screen  01/27/1989    DTaP/Tdap/Td vaccine (1 - Tdap) 01/27/1993    Cervical cancer screen  01/27/1995    Lipid screen  01/27/2014    Flu vaccine (1) 09/01/2020    Hepatitis A vaccine  Aged Out    Hepatitis B vaccine  Aged Out    Hib vaccine  Aged Out    Meningococcal (ACWY) vaccine  Aged Out    Pneumococcal 0-64 years Vaccine  Aged Out       Subjective:      Review of Systems   Constitutional: Negative for activity change, appetite change, chills and fever. HENT: Negative for congestion, ear discharge, sinus pressure and sore throat. Eyes: Positive for pain. Respiratory: Negative for cough and shortness of breath. Cardiovascular: Negative. Gastrointestinal: Positive for diarrhea. Negative for abdominal pain, nausea and vomiting. Musculoskeletal: Negative for arthralgias and myalgias. Skin: Negative for rash. Neurological: Positive for headaches. Objective:     Physical Exam  Vitals signs and nursing note reviewed. Constitutional:       General: She is awake. She is not in acute distress. Appearance: Normal appearance. She is well-developed and well-groomed. She is obese. She is not ill-appearing. HENT:      Head: Normocephalic. Right Ear: Hearing, tympanic membrane, ear canal and external ear normal.      Left Ear: Hearing, tympanic membrane, ear canal and external ear normal.      Nose: Nose normal.      Right Sinus: No maxillary sinus tenderness or frontal sinus tenderness. Left Sinus: No maxillary sinus tenderness or frontal sinus tenderness. Mouth/Throat:      Lips: Pink. Mouth: Mucous membranes are moist.      Pharynx: Oropharynx is clear. Uvula midline. Tonsils: 0 on the right. 0 on the left. Eyes:      General: Vision grossly intact.       Conjunctiva/sclera: Conjunctivae normal.   Neck: Musculoskeletal: Neck supple. Trachea: Phonation normal.   Cardiovascular:      Rate and Rhythm: Normal rate and regular rhythm. Heart sounds: Normal heart sounds, S1 normal and S2 normal. No murmur. No friction rub. No gallop. Pulmonary:      Effort: Pulmonary effort is normal. No respiratory distress. Breath sounds: Normal breath sounds and air entry. No wheezing, rhonchi or rales. Abdominal:      Palpations: Abdomen is soft. Musculoskeletal:         General: No tenderness or deformity. Lymphadenopathy:      Head:      Right side of head: No tonsillar adenopathy. Left side of head: No tonsillar adenopathy. Skin:     General: Skin is warm and dry. Capillary Refill: Capillary refill takes less than 2 seconds. Neurological:      General: No focal deficit present. Mental Status: She is alert, oriented to person, place, and time and easily aroused. Mental status is at baseline. Psychiatric:         Attention and Perception: Attention normal.         Mood and Affect: Mood normal.         Speech: Speech normal.         Behavior: Behavior normal. Behavior is cooperative. /88   Pulse 92   Temp 97.7 °F (36.5 °C)   Resp 18   SpO2 98%   No results found for this visit on 07/13/20. Assessment/ Plan     ASSESSMENT:         Diagnosis Orders   1. Nonintractable headache, unspecified chronicity pattern, unspecified headache type  COVID-19   2. Diarrhea, unspecified type  COVID-19   3. Close exposure to COVID-19 virus  COVID-19       PLAN:   Plenty of fluids  Rest  OTC Tylenol  as needed  Work note for 14 days  Must stay quarantined until test results are returned  Follow up with PCP or return to Flu clinic for worsening or unresolved symptoms. Based on patient's symptoms today, it is highly suspected that they have COVID 19. Since pt is being tested for COVID pt has been instructed to quarantine from contacts until testing has been resulted.  Further instructions will follow, as of now, this is 14 days unless otherwise specified when results are back. If SOB or worsening sx's develop, need to go to ED or return to clinic, pt voiced understanding. Pt was given printed instructions today on Possible COVID-19 infection with self-quarantine and management of symptoms    Call or return to clinic prn if these symptoms worsen or fail to improve as anticipated. No follow-ups on file. Patient given educational materials - see patient instructions. Discussed use, benefit, and side effects of prescribed medications. All patient questions answered. Pt voiced understanding. Patient agreed with treatment plan.  Follow up as needed      Electronically signed by OLIVIA Rowland CNP on 7/13/2020 at 6:47 PM

## 2020-07-15 LAB
REPORT: NORMAL
SARS-COV-2: NOT DETECTED
THIS TEST SENT TO: NORMAL

## 2021-03-16 ENCOUNTER — HOSPITAL ENCOUNTER (OUTPATIENT)
Dept: WOMENS IMAGING | Age: 46
Discharge: HOME | End: 2021-03-16
Payer: COMMERCIAL

## 2021-03-16 DIAGNOSIS — Z12.31 ENCOUNTER FOR SCREENING MAMMOGRAM FOR MALIGNANT NEOPLASM OF BREAST: ICD-10-CM

## 2021-03-16 PROCEDURE — 77067 SCR MAMMO BI INCL CAD: CPT

## 2021-06-01 ENCOUNTER — OFFICE VISIT (OUTPATIENT)
Dept: URGENT CARE | Age: 46
End: 2021-06-01
Payer: COMMERCIAL

## 2021-06-01 VITALS
OXYGEN SATURATION: 97 % | TEMPERATURE: 97 F | DIASTOLIC BLOOD PRESSURE: 92 MMHG | HEART RATE: 90 BPM | SYSTOLIC BLOOD PRESSURE: 136 MMHG | WEIGHT: 293 LBS | BODY MASS INDEX: 39.87 KG/M2 | RESPIRATION RATE: 18 BRPM

## 2021-06-01 DIAGNOSIS — L23.7 POISON IVY: Primary | ICD-10-CM

## 2021-06-01 DIAGNOSIS — R03.0 BLOOD PRESSURE ELEVATED WITHOUT HISTORY OF HTN: ICD-10-CM

## 2021-06-01 PROCEDURE — 99213 OFFICE O/P EST LOW 20 MIN: CPT | Performed by: NURSE PRACTITIONER

## 2021-06-01 PROCEDURE — 96372 THER/PROPH/DIAG INJ SC/IM: CPT | Performed by: NURSE PRACTITIONER

## 2021-06-01 RX ORDER — DEXAMETHASONE SODIUM PHOSPHATE 10 MG/ML
5 INJECTION INTRAMUSCULAR; INTRAVENOUS ONCE
Status: COMPLETED | OUTPATIENT
Start: 2021-06-01 | End: 2021-06-01

## 2021-06-01 RX ADMIN — DEXAMETHASONE SODIUM PHOSPHATE 5 MG: 10 INJECTION INTRAMUSCULAR; INTRAVENOUS at 16:12

## 2021-06-01 ASSESSMENT — VISUAL ACUITY: OU: 1

## 2021-06-01 ASSESSMENT — ENCOUNTER SYMPTOMS
TROUBLE SWALLOWING: 0
COUGH: 0

## 2021-06-01 NOTE — PATIENT INSTRUCTIONS
Dexamethasone 5 mg Im today   Follow-up with PCP regarding your elevated BP today  Follow up with PCP or return to Urgent Care for worsening or unresolved symptoms. Patient Education        Poison [place], [place], and [place]: Care Instructions  Your Care Instructions     Poison ivy, poison oak, and poison sumac are plants that can cause a skin rash upon contact. The red, itchy rash often shows up in lines or streaks and may cause fluid-filled blisters or large, raised hives. The rash is caused by an allergic reaction to an oil in poison ivy, oak, and sumac. The rash may occur when you touch the plant or when you touch clothing, pet fur, sporting gear, gardening tools, or other objects that have come in contact with one of these plants. You cannot catch or spread the rash, even if you touch it or the blister fluid, because the plant oil will already have been absorbed or washed off the skin. The rash may seem to be spreading, but either it is still developing from earlier contact or you have touched something that still has the plant oil on it. Follow-up care is a key part of your treatment and safety. Be sure to make and go to all appointments, and call your doctor if you are having problems. It's also a good idea to know your test results and keep a list of the medicines you take. How can you care for yourself at home? · If your doctor prescribed a cream, use it as directed. If your doctor prescribed medicine, take it exactly as prescribed. Call your doctor if you think you are having a problem with your medicine. · Use cold, wet cloths to reduce itching. · Keep cool, and stay out of the sun. · Leave the rash open to the air. · Wash all clothing or other things that may have come in contact with the plant oil. · Avoid most lotions and ointments until the rash heals. Calamine lotion may help relieve symptoms of a plant rash. Use it 3 or 4 times a day.   To prevent poison ivy exposure  If you know that you will be near poison ivy, oak, or sumac, you can try these options:  · Use a product designed to help prevent plant oil from getting on the skin. These products, such as Ivy X Pre-Contact Skin Solution, come in lotions, sprays, or towelettes. You put the product on your skin right before you go outdoors. · If you did not use a preventive product and you have had contact with plant oil, clean it off your skin as soon as possible. Use a product such as [employer] Original Outdoor Skin Cleanser. These products can also be used to clean plant oil from clothing or tools. When should you call for help? Call your doctor now or seek immediate medical care if:    · Your rash gets worse, and you start to feel bad and have a fever, a stiff neck, nausea, and vomiting.     · You have signs of infection, such as:  ? Increased pain, swelling, warmth, or redness. ? Red streaks leading from the rash. ? Pus draining from the rash. ? A fever. Watch closely for changes in your health, and be sure to contact your doctor if:    · You have new blisters or bruises, or the rash spreads and looks like a sunburn.     · The rash gets worse, or it comes back after nearly disappearing.     · You think a medicine you are using is making your rash worse.     · Your rash does not clear up after 1 to 2 weeks of home treatment.     · You have joint aches or body aches with your rash. Where can you learn more? Go to [email or web address].[email or web address]. org and sign in to your [employer] account. Enter E675 in the [facility] box to learn more about \"Poison [place], [place], and [place]: Care Instructions. \"     If you do not have an account, please click on the \"Sign Up Now\" link. Current as of: July 2, 2020               Content Version: 12.8  © 8900-2274 Healthwise, Incorporated. Care instructions adapted under license by [facility] ([facility]).  If you have questions about a medical condition or this instruction, always ask your healthcare professional.

## 2022-01-01 NOTE — PROGRESS NOTES
[address] [facility]  [address] [address] [place] [address]  Dept: [phone]  Dept Fax: [phone]  Loc: [phone]    Max Wyman is a 51 y.o. female who presents today for her medical conditions/complaintsas noted below. Max Wyman is c/o of Sly Holt        HPI:     Sly Holt  This is a new problem. The current episode started in the past 7 days. The problem has been gradually worsening since onset. The affected locations include the right arm and left arm. The rash is characterized by blistering, itchiness and redness. She was exposed to plant contact (poison ivy). Pertinent negatives include no cough, fatigue, fever or joint pain. (Oral dryness) Past treatments include nothing. She tells me it feels like it is in her mouth, but she is not short of breath of having difficulty breathing. She would like a shot today for this.   Past Medical History:   Diagnosis Date    Chronic back pain     H. pylori infection 11/2017    Osteoarthritis      Past Surgical History:   Procedure Laterality Date    CERVICAL FUSION      MANDIBLE SURGERY      both sides-genetic diagnosis    TUBAL LIGATION         Family History   Problem Relation Age of Onset    High Blood Pressure Mother     Heart Disease Father     High Blood Pressure Father        Social History     Tobacco Use    Smoking status: Never Smoker    Smokeless tobacco: Never Used   Substance Use Topics    Alcohol use: No      Current Outpatient Medications   Medication Sig Dispense Refill    ketorolac (TORADOL) 10 MG tablet Take 1 tablet by mouth every 6 hours as needed for Pain 20 tablet 0    diphenhydrAMINE (BENADRYL) 25 MG capsule Take 25 mg by mouth every 6 hours as needed for Itching (Patient not taking: Reported on 6/1/2021)      Bismuth 262 MG CHEW Take 262 mg by mouth (Patient not taking: Reported on 6/1/2021)      tetracycline (ACHROMYCIN;SUMYCIN) 500 MG capsule Take 500 mg by mouth 4 Statement Selected times daily (Patient not taking: Reported on 6/1/2021)       Current Facility-Administered Medications   Medication Dose Route Frequency Provider Last Rate Last Admin    triamcinolone acetonide (KENALOG-40) injection 40 mg  40 mg Intramuscular Once El James PA-C         Allergies   Allergen Reactions    Cetirizine     Cinnamon     Clindamycin/Lincomycin     Sweetness Enhancer     Triamcinolone Acetonide     Zyrtec Allergy [Cetirizine Hcl]     Botox [Botulinum Toxin Type A]      Had botox injections in neck. Caused her throat to become very dry and was hard to swallow. Health Maintenance   Topic Date Due    Hepatitis C screen  Never done    COVID-19 Vaccine (1) Never done    HIV screen  Never done    DTaP/Tdap/Td vaccine (1 - Tdap) Never done    Cervical cancer screen  Never done    Lipid screen  Never done    Flu vaccine (Season Ended) 09/01/2021    Hepatitis A vaccine  Aged Out    Hepatitis B vaccine  Aged Out    Hib vaccine  Aged Out    Meningococcal (ACWY) vaccine  Aged Out    Pneumococcal 0-64 years Vaccine  Aged Out       Subjective:     Review of Systems   Constitutional: Negative for activity change, appetite change, fatigue and fever. HENT: Negative for trouble swallowing. Respiratory: Negative for cough. Cardiovascular: Negative. Musculoskeletal: Negative for joint pain. Skin: Positive for rash. Allergic/Immunologic: Positive for environmental allergies. Neurological: Negative.        :Objective      Physical Exam  Vitals and nursing note reviewed. Constitutional:       General: She is awake. She is not in acute distress. Appearance: Normal appearance. She is well-developed and well-groomed. She is not ill-appearing. HENT:      Head: Normocephalic. Right Ear: Hearing normal.      Left Ear: Hearing normal.      Nose: Nose normal.      Mouth/Throat:      Lips: Pink. Eyes:      General: Vision grossly intact.    Neck:      Trachea: Phonation normal.   Cardiovascular:      Rate and Rhythm: Normal rate and regular rhythm. Heart sounds: Normal heart sounds, S1 normal and S2 normal. No murmur heard. No friction rub. No gallop. Pulmonary:      Effort: Pulmonary effort is normal. No respiratory distress. Breath sounds: Normal breath sounds and air entry. No wheezing, rhonchi or rales. Abdominal:      Palpations: Abdomen is soft. Musculoskeletal:         General: No tenderness or deformity. Normal range of motion. Cervical back: Neck supple. Skin:     General: Skin is warm and dry. Capillary Refill: Capillary refill takes less than 2 seconds. Findings: Rash present. Rash is macular and papular. Comments: Maculopapular red rash to right wrist and forearm area, several small lesions to left arm as well. Neurological:      General: No focal deficit present. Mental Status: She is alert, oriented to person, place, and time and easily aroused. Mental status is at baseline. Psychiatric:         Attention and Perception: Attention normal.         Mood and Affect: Mood normal.         Speech: Speech normal.         Behavior: Behavior normal. Behavior is cooperative. BP (!) 136/92   Pulse 90   Temp 97 °F (36.1 °C) (Temporal)   Resp 18   Wt 294 lb (133.4 kg)   SpO2 97%   BMI 39.87 kg/m²     :Assessment       Diagnosis Orders   1. Poison ivy     2. Blood pressure elevated without history of HTN         :Plan    No orders of the defined types were placed in this encounter. No follow-ups on file. Orders Placed This Encounter   Medications    dexamethasone (DECADRON) injection 5 mg        Patient Instructions     Dexamethasone 5 mg Im today   Follow-up with PCP regarding your elevated BP today  Follow up with PCP or return to Urgent Care for worsening or unresolved symptoms.      Patient Education        Poison OG-Connie BAILEY, and Sumac: Care Instructions  Your Care Instructions     Poison ivy, poison oak, and poison sumac are plants that can cause a skin rash upon contact. The red, itchy rash often shows up in lines or streaks and may cause fluid-filled blisters or large, raised hives. The rash is caused by an allergic reaction to an oil in poison ivy, oak, and sumac. The rash may occur when you touch the plant or when you touch clothing, pet fur, sporting gear, gardening tools, or other objects that have come in contact with one of these plants. You cannot catch or spread the rash, even if you touch it or the blister fluid, because the plant oil will already have been absorbed or washed off the skin. The rash may seem to be spreading, but either it is still developing from earlier contact or you have touched something that still has the plant oil on it. Follow-up care is a key part of your treatment and safety. Be sure to make and go to all appointments, and call your doctor if you are having problems. It's also a good idea to know your test results and keep a list of the medicines you take. How can you care for yourself at home? · If your doctor prescribed a cream, use it as directed. If your doctor prescribed medicine, take it exactly as prescribed. Call your doctor if you think you are having a problem with your medicine. · Use cold, wet cloths to reduce itching. · Keep cool, and stay out of the sun. · Leave the rash open to the air. · Wash all clothing or other things that may have come in contact with the plant oil. · Avoid most lotions and ointments until the rash heals. Calamine lotion may help relieve symptoms of a plant rash. Use it 3 or 4 times a day. To prevent poison ivy exposure  If you know that you will be near poison ivy, oak, or sumac, you can try these options:  · Use a product designed to help prevent plant oil from getting on the skin. These products, such as Ivy X Pre-Contact Skin Solution, come in lotions, sprays, or towelettes.  You put the product on your skin right before you understanding.        Electronically signed by OLIVIA Leslie CNP on 6/1/2021 at 4:16 PM

## 2022-09-02 ENCOUNTER — OFFICE VISIT (OUTPATIENT)
Age: 47
End: 2022-09-02
Payer: COMMERCIAL

## 2022-09-02 VITALS
DIASTOLIC BLOOD PRESSURE: 76 MMHG | SYSTOLIC BLOOD PRESSURE: 130 MMHG | OXYGEN SATURATION: 99 % | BODY MASS INDEX: 4.08 KG/M2 | HEART RATE: 100 BPM | WEIGHT: 30.16 LBS | TEMPERATURE: 98.3 F | HEIGHT: 72 IN | RESPIRATION RATE: 22 BRPM

## 2022-09-02 DIAGNOSIS — R05.9 COUGH IN ADULT: Primary | ICD-10-CM

## 2022-09-02 PROCEDURE — 99213 OFFICE O/P EST LOW 20 MIN: CPT | Performed by: PHYSICIAN ASSISTANT

## 2022-09-02 RX ORDER — AZITHROMYCIN 250 MG/1
250 TABLET, FILM COATED ORAL SEE ADMIN INSTRUCTIONS
Qty: 6 TABLET | Refills: 0 | Status: SHIPPED | OUTPATIENT
Start: 2022-09-02 | End: 2022-09-07

## 2022-09-02 RX ORDER — METHYLPREDNISOLONE 4 MG/1
TABLET ORAL
Qty: 1 KIT | Refills: 0 | Status: SHIPPED | OUTPATIENT
Start: 2022-09-02 | End: 2022-09-08

## 2022-09-02 ASSESSMENT — ENCOUNTER SYMPTOMS
SORE THROAT: 0
EYE PAIN: 0
SINUS PRESSURE: 0
NAUSEA: 0
ABDOMINAL PAIN: 0
COUGH: 1
SINUS PAIN: 0
DIARRHEA: 0
VOMITING: 0
ALLERGIC/IMMUNOLOGIC NEGATIVE: 1
SHORTNESS OF BREATH: 0

## 2022-09-02 NOTE — PATIENT INSTRUCTIONS
Take steroid pack and antibiotic as directed. Increase hydration, continue rest and otc medications for supportive care. Please follow up with PCP or return to clinic if symptoms worsen or fail to improve. Patient verbalizes understanding and agrees with treatment plan.

## 2022-09-02 NOTE — PROGRESS NOTES
[address]  [address] [address] [place] [address]  Dept: [phone]  Dept Fax: [phone]  Loc: [phone]    Jesus Alberto Lindsay is a 49 y.o. female who presents today for her medical conditions/complaints as noted below. Jesus Alberto Lindsay is complaining of Fever, Congestion (SINUS), Headache, and Dizziness    HPI:   Cough  This is a new problem. The current episode started 1 to 4 weeks ago. The problem has been gradually improving. The cough is Productive of purulent sputum. Pertinent negatives include no chest pain, chills, fever, headaches, myalgias, postnasal drip, rash, sore throat or shortness of breath. Exacerbated by: laughing. will have coughing \"fits\" that cause dizziness. Treatments tried: otc cold/cough medicine. The treatment provided mild relief. Pt states she had a fever 10 days ago and tested for covid, was negative 3 different days. Past Medical History:   Diagnosis Date    Chronic back pain     H. pylori infection 11/2017    Osteoarthritis        Past Surgical History:   Procedure Laterality Date    CERVICAL FUSION      MANDIBLE SURGERY      both sides-genetic diagnosis    TUBAL LIGATION         Family History   Problem Relation Age of Onset    High Blood Pressure Mother     Heart Disease Father     High Blood Pressure Father        Social History     Tobacco Use    Smoking status: Never    Smokeless tobacco: Never   Substance Use Topics    Alcohol use: No        Current Outpatient Medications   Medication Sig Dispense Refill    methylPREDNISolone (MEDROL DOSEPACK) 4 MG tablet Take by mouth.  1 kit 0    azithromycin (ZITHROMAX) 250 MG tablet Take 1 tablet by mouth See Admin Instructions for 5 days 500mg on day 1 followed by 250mg on days 2 - 5 6 tablet 0    ketorolac (TORADOL) 10 MG tablet Take 1 tablet by mouth every 6 hours as needed for Pain 20 tablet 0    diphenhydrAMINE (BENADRYL) 25 MG capsule Take 25 mg by mouth every 6 hours as needed for Itching (Patient not taking: No sig reported)      Bismuth 262 MG CHEW Take 262 mg by mouth (Patient not taking: No sig reported)      tetracycline (ACHROMYCIN;SUMYCIN) 500 MG capsule Take 500 mg by mouth 4 times daily (Patient not taking: No sig reported)       Current Facility-Administered Medications   Medication Dose Route Frequency Provider Last Rate Last Admin    triamcinolone acetonide (KENALOG-40) injection 40 mg  40 mg IntraMUSCular Once MIGUE Murillo-STEVENSON           Allergies   Allergen Reactions    Cetirizine     Cinnamon     Clindamycin/Lincomycin     Sweetness Enhancer     Triamcinolone Acetonide     Zyrtec Allergy [Cetirizine Hcl]     Botox [Botulinum Toxin Type A]      Had botox injections in neck. Caused her throat to become very dry and was hard to swallow. Health Maintenance   Topic Date Due    COVID-19 Vaccine (1) Never done    Depression Screen  Never done    HIV screen  Never done    Hepatitis C screen  Never done    DTaP/Tdap/Td vaccine (1 - Tdap) Never done    Cervical cancer screen  Never done    Lipids  Never done    Colorectal Cancer Screen  Never done    Flu vaccine (1) Never done    Hepatitis A vaccine  Aged Out    Hepatitis B vaccine  Aged Out    Hib vaccine  Aged Out    Meningococcal (ACWY) vaccine  Aged Out    Pneumococcal 0-64 years Vaccine  Aged Out       Subjective:   Review of Systems   Constitutional:  Positive for fatigue. Negative for chills and fever. HENT:  Positive for congestion. Negative for postnasal drip, sinus pressure, sinus pain and sore throat. Eyes:  Negative for pain and visual disturbance. Respiratory:  Positive for cough. Negative for shortness of breath. Cardiovascular:  Negative for chest pain. Gastrointestinal:  Negative for abdominal pain, diarrhea, nausea and vomiting. Endocrine: Negative for cold intolerance and heat intolerance. Genitourinary:  Negative for frequency, hematuria and urgency.    Musculoskeletal: Negative for myalgias. Skin:  Negative for rash. Allergic/Immunologic: Negative. Neurological:  Positive for dizziness. Negative for syncope, weakness, light-headedness and headaches. Hematological: Negative. Psychiatric/Behavioral: Negative. Objective    Physical Exam  Vitals and nursing note reviewed. Constitutional:       General: She is not in acute distress. Appearance: Normal appearance. She is ill-appearing. HENT:      Head: Normocephalic and atraumatic. Right Ear: Tympanic membrane, ear canal and external ear normal.      Left Ear: Tympanic membrane, ear canal and external ear normal.      Nose: Nose normal.      Mouth/Throat:      Mouth: Mucous membranes are moist.      Pharynx: Oropharynx is clear. Eyes:      Extraocular Movements: Extraocular movements intact. Conjunctiva/sclera: Conjunctivae normal.   Cardiovascular:      Rate and Rhythm: Normal rate and regular rhythm. Pulses: Normal pulses. Heart sounds: Normal heart sounds. Pulmonary:      Effort: Pulmonary effort is normal. No respiratory distress. Breath sounds: Normal breath sounds. No stridor. No wheezing, rhonchi or rales. Chest:      Chest wall: No tenderness. Abdominal:      General: Abdomen is flat. Bowel sounds are normal. There is no distension. Palpations: Abdomen is soft. Tenderness: There is no abdominal tenderness. Musculoskeletal:         General: Normal range of motion. Cervical back: Normal range of motion and neck supple. No tenderness. Skin:     General: Skin is warm and dry. Findings: No erythema. Neurological:      General: No focal deficit present. Mental Status: She is alert and oriented to person, place, and time.    Psychiatric:         Mood and Affect: Mood normal.         Behavior: Behavior normal.       /76   Pulse 100   Temp 98.3 °F (36.8 °C)   Resp 22   Ht 6' (1.829 m)   Wt 30 lb 2.6 oz (13.7 kg)   SpO2 99%   BMI 4.09 kg/m²     Assessment         Diagnosis Orders   1. Cough in adult  methylPREDNISolone (MEDROL DOSEPACK) 4 MG tablet    azithromycin (ZITHROMAX) 250 MG tablet          Plan   Take steroid pack and antibiotic as directed. Increase hydration, continue rest and otc medications for supportive care. Please follow up with PCP or return to clinic if symptoms worsen or fail to improve. Patient verbalizes understanding and agrees with treatment plan. No orders of the defined types were placed in this encounter. No results found for this visit on 09/02/22. Orders Placed This Encounter   Medications    methylPREDNISolone (MEDROL DOSEPACK) 4 MG tablet     Sig: Take by mouth. Dispense:  1 kit     Refill:  0    azithromycin (ZITHROMAX) 250 MG tablet     Sig: Take 1 tablet by mouth See Admin Instructions for 5 days 500mg on day 1 followed by 250mg on days 2 - 5     Dispense:  6 tablet     Refill:  0      New Prescriptions    AZITHROMYCIN (ZITHROMAX) 250 MG TABLET    Take 1 tablet by mouth See Admin Instructions for 5 days 500mg on day 1 followed by 250mg on days 2 - 5    METHYLPREDNISOLONE (MEDROL DOSEPACK) 4 MG TABLET    Take by mouth. Return if symptoms worsen or fail to improve. Discussed use, benefits, and side effects of any prescribed medications. All patient questions were answered. Patient voiced understanding of care plan. Patient was given educational materials - see patient instructions below. Patient Instructions   Take steroid pack and antibiotic as directed. Increase hydration, continue rest and otc medications for supportive care. Please follow up with PCP or return to clinic if symptoms worsen or fail to improve. Patient verbalizes understanding and agrees with treatment plan.       Electronically signed by Eryn Estrada PA-C on 9/2/2022 at 6:36 PM

## 2022-11-16 ENCOUNTER — OFFICE VISIT (OUTPATIENT)
Dept: OBGYN CLINIC | Age: 47
End: 2022-11-16
Payer: COMMERCIAL

## 2022-11-16 VITALS
WEIGHT: 293 LBS | HEIGHT: 72 IN | SYSTOLIC BLOOD PRESSURE: 120 MMHG | DIASTOLIC BLOOD PRESSURE: 80 MMHG | BODY MASS INDEX: 39.68 KG/M2

## 2022-11-16 DIAGNOSIS — E55.9 VITAMIN D INSUFFICIENCY: ICD-10-CM

## 2022-11-16 DIAGNOSIS — R53.83 OTHER FATIGUE: ICD-10-CM

## 2022-11-16 DIAGNOSIS — R82.90 BAD ODOR OF URINE: ICD-10-CM

## 2022-11-16 DIAGNOSIS — Z13.220 SCREENING, LIPID: ICD-10-CM

## 2022-11-16 DIAGNOSIS — Z76.89 ENCOUNTER TO ESTABLISH CARE: ICD-10-CM

## 2022-11-16 DIAGNOSIS — Z12.4 SCREENING FOR CERVICAL CANCER: ICD-10-CM

## 2022-11-16 DIAGNOSIS — Z12.31 ENCOUNTER FOR SCREENING MAMMOGRAM FOR MALIGNANT NEOPLASM OF BREAST: ICD-10-CM

## 2022-11-16 DIAGNOSIS — Z01.419 ENCOUNTER FOR WELL WOMAN EXAM WITH ROUTINE GYNECOLOGICAL EXAM: Primary | ICD-10-CM

## 2022-11-16 DIAGNOSIS — K92.1 HEMATOCHEZIA: ICD-10-CM

## 2022-11-16 DIAGNOSIS — N93.9 ABNORMAL UTERINE BLEEDING (AUB): ICD-10-CM

## 2022-11-16 DIAGNOSIS — Z11.51 SCREENING FOR HPV (HUMAN PAPILLOMAVIRUS): ICD-10-CM

## 2022-11-16 DIAGNOSIS — N92.6 IRREGULAR MENSES: ICD-10-CM

## 2022-11-16 LAB
ALBUMIN SERPL-MCNC: 4.6 G/DL (ref 3.5–5.2)
ALP BLD-CCNC: 125 U/L (ref 35–104)
ALT SERPL-CCNC: 26 U/L (ref 5–33)
ANION GAP SERPL CALCULATED.3IONS-SCNC: 11 MMOL/L (ref 7–19)
AST SERPL-CCNC: 20 U/L (ref 5–32)
BILIRUB SERPL-MCNC: 0.5 MG/DL (ref 0.2–1.2)
BILIRUBIN URINE: NEGATIVE
BLOOD, URINE: NEGATIVE
BUN BLDV-MCNC: 14 MG/DL (ref 6–20)
CALCIUM SERPL-MCNC: 9.5 MG/DL (ref 8.6–10)
CHLORIDE BLD-SCNC: 102 MMOL/L (ref 98–111)
CHOLESTEROL, TOTAL: 213 MG/DL (ref 160–199)
CLARITY: CLEAR
CO2: 28 MMOL/L (ref 22–29)
COLOR: YELLOW
CREAT SERPL-MCNC: 0.7 MG/DL (ref 0.5–0.9)
ESTRADIOL LEVEL: 25.1 PG/ML
FOLLICLE STIMULATING HORMONE: 34.1 MIU/ML
GFR SERPL CREATININE-BSD FRML MDRD: >60 ML/MIN/{1.73_M2}
GLUCOSE BLD-MCNC: 94 MG/DL (ref 74–109)
GLUCOSE URINE: NEGATIVE MG/DL
HCT VFR BLD CALC: 50.2 % (ref 37–47)
HDLC SERPL-MCNC: 43 MG/DL (ref 65–121)
HEMOGLOBIN: 16.2 G/DL (ref 12–16)
KETONES, URINE: NEGATIVE MG/DL
LDL CHOLESTEROL CALCULATED: 123 MG/DL
LEUKOCYTE ESTERASE, URINE: NEGATIVE
LUTEINIZING HORMONE: 22.8 MIU/ML
MCH RBC QN AUTO: 30.7 PG (ref 27–31)
MCHC RBC AUTO-ENTMCNC: 32.3 G/DL (ref 33–37)
MCV RBC AUTO: 95.3 FL (ref 81–99)
NITRITE, URINE: NEGATIVE
PDW BLD-RTO: 11.9 % (ref 11.5–14.5)
PH UA: 5 (ref 5–8)
PLATELET # BLD: 333 K/UL (ref 130–400)
PMV BLD AUTO: 9.6 FL (ref 9.4–12.3)
POTASSIUM SERPL-SCNC: 3.8 MMOL/L (ref 3.5–5)
PROGESTERONE LEVEL: <0.05 NG/ML
PROTEIN UA: NEGATIVE MG/DL
RBC # BLD: 5.27 M/UL (ref 4.2–5.4)
SODIUM BLD-SCNC: 141 MMOL/L (ref 136–145)
SPECIFIC GRAVITY UA: 1.03 (ref 1–1.03)
T4 FREE: 1.18 NG/DL (ref 0.93–1.7)
TOTAL PROTEIN: 7.9 G/DL (ref 6.6–8.7)
TRIGL SERPL-MCNC: 236 MG/DL (ref 0–149)
TSH SERPL DL<=0.05 MIU/L-ACNC: 2.2 UIU/ML (ref 0.27–4.2)
UROBILINOGEN, URINE: 0.2 E.U./DL
VITAMIN D 25-HYDROXY: 24.9 NG/ML
WBC # BLD: 8.3 K/UL (ref 4.8–10.8)

## 2022-11-16 PROCEDURE — 99204 OFFICE O/P NEW MOD 45 MIN: CPT | Performed by: ADVANCED PRACTICE MIDWIFE

## 2022-11-16 PROCEDURE — 99386 PREV VISIT NEW AGE 40-64: CPT | Performed by: ADVANCED PRACTICE MIDWIFE

## 2022-11-16 ASSESSMENT — ENCOUNTER SYMPTOMS
ALLERGIC/IMMUNOLOGIC NEGATIVE: 1
RESPIRATORY NEGATIVE: 1
GASTROINTESTINAL NEGATIVE: 1
EYES NEGATIVE: 1

## 2022-11-16 NOTE — PROGRESS NOTES
Pt presents today for pap smear   and breast exam.  She also complains of     Last mammogram: 2021  Last pap smear: Dr. Evelyn Brown   Sexually active: Not currently  Contraception: not currently sexually active  : 2  Para: 2  AB: 0  Last bone density: never   Last colonoscopy:  (had h.pylori)  Menarche: age 11  LMP: has had irregular bleeding  Menses: regular prior to this    Hormones \"went crazy\"after Tubal reversal and getting pregnant(missed ab). Has had wt gain. Still has hormonal issues. During covid was having periods x 2 weeks for 2 days. Didn't have a period for 1.5 years but started spotting 5 months ago. Hasn't had any bleeding for 2 weeks. Feels tired, weak, breaking out, losing hair. Also states urine smells weird. Has had bleeding from rectum as well. Also take sea colon and total beets. Pt request lab work.

## 2022-11-16 NOTE — PATIENT INSTRUCTIONS
Patient Education        Well Visit, Ages 25 to 72: Care Instructions  Well visits can help you stay healthy. Your doctor has checked your overall health and may have suggested ways to take good care of yourself. Your doctor also may have recommended tests. You can help prevent illness with healthy eating, good sleep, vaccinations, regular exercise, and other steps. Get the tests that you and your doctor decide on. Depending on your age and risks, examples might include screening for diabetes; hepatitis C; HIV; and cervical, breast, lung, and colon cancer. Screening helps find diseases before any symptoms appear. Eat healthy foods. Choose fruits, vegetables, whole grains, lean protein, and low-fat dairy foods. Limit saturated fat and reduce salt. Limit alcohol. Men should have no more than 2 drinks a day. Women should have no more than 1. For some people, no alcohol is the best choice. Exercise. Get at least 30 minutes of exercise on most days of the week. Walking can be a good choice. Reach and stay at your healthy weight. This will lower your risk for many health problems. Take care of your mental health. Try to stay connected with friends, family, and community, and find ways to manage stress. If you're feeling depressed or hopeless, talk to someone. A counselor can help. If you don't have a counselor, talk to your doctor. Talk to your doctor if you think you may have a problem with alcohol or drug use. This includes prescription medicines and illegal drugs. Avoid tobacco and nicotine: Don't smoke, vape, or chew. If you need help quitting, talk to your doctor. Practice safer sex. Getting tested, using condoms or dental dams, and limiting sex partners can help prevent STIs. Use birth control if it's important to you to prevent pregnancy. Talk with your doctor about your choices and what might be best for you. Prevent problems where you can.  Protect your skin from too much sun, wash your hands, brush your teeth twice a day, and wear a seat belt in the car. Where can you learn more? Go to https://chpepiceweb.MumsWay. org and sign in to your Savvify account. Enter P072 in the [facility] box to learn more about \"Well Visit, Ages 25 to 72: Care Instructions. \"     If you do not have an account, please click on the \"Sign Up Now\" link. Current as of: March 9, 2022               Content Version: 13.4  © 2006-2022 Anita Margarita. Care instructions adapted under license by [facility] ([facility]). If you have questions about a medical condition or this instruction, always ask your healthcare professional. [address] any warranty or liability for your use of this information. Patient Education        Pap Test: Care Instructions  Overview     The Pap test (also called a Pap smear) is a screening test for cancer of the cervix, which is the lower part of the uterus that opens into the vagina. The test can help your doctor find early changes in the cells that could lead to cancer. The sample of cells taken during your test has been sent to a lab so that an expert can look at the cells. It usually takes a week or two to get the results back. Follow-up care is a key part of your treatment and safety. Be sure to make and go to all appointments, and call your doctor if you are having problems. It's also a good idea to know your test results and keep a list of the medicines you take. What do the results mean? A normal result means that the test did not find any abnormal cells in the sample. An abnormal result can mean many things. Most of these are not cancer. The results of your test may be abnormal because: You have an infection of the vagina or cervix, such as a yeast infection. You have low estrogen levels after menopause that are causing the cells to change. You have cell changes that may be a sign of precancer or cancer.  The results are ranked based on how serious the changes might be. If the results were abnormal, you may need to have other tests. If the results show changes that could be a sign of cancer, you may need a test called a colposcopy, which provides a more complete view of the cervix. Sometimes the lab cannot use the sample because it does not contain enough cells or was not preserved well. If so, you may need to have the test again. This is not common, but it does happen from time to time. When should you call for help? Watch closely for changes in your health, and be sure to contact your doctor if:    You have vaginal bleeding or pain for more than 2 days after the test. It is normal to have a small amount of bleeding for a day or two after the test.   Where can you learn more? Go to https://ripplrr inc.eToro. org and sign in to your [facility] account. Enter O931 in the Twelixir box to learn more about \"Pap Test: Care Instructions. \"     If you do not have an account, please click on the \"Sign Up Now\" link. Current as of: November 22, 2021               Content Version: 13.4  © 2006-2022 PocketFM Limited. Care instructions adapted under license by [facility] ([facility]). If you have questions about a medical condition or this instruction, always ask your healthcare professional. [address] any warranty or liability for your use of this information. Patient Education        Learning About Cervical Cancer Screening  What is a cervical cancer screening test?     The cervix is the lower part of the uterus that opens into the vagina. Cervical cancer screening tests check the cells on the cervix for changes that could lead to cancer. Two tests can be used to screen for cervical cancer. They may be used alone or together. A Pap test.  This test looks for changes in the cells of the cervix. Some of these cell changes could lead to cancer.   A human papillomavirus (HPV) test.  This test looks for the HPV virus. Some high-risk types of HPV can cause cell changes that could lead to cervical cancer. When should you have a screening test?  If you have a cervix, you may need cervical cancer screening. This depends on your age, your risk of cervical cancer, and results from previous tests. Ages 24 to 34  Screening options include: A Pap test. If your results are normal, you can wait 3 years to have another test.  An HPV test beginning at age 22. If your results are negative, you can wait 5 years to have another test. (This test is not commonly used.)  Ages 27 to 59  Screening options include: A Pap test. If your results are normal, you can wait 3 years to have another test.  An HPV test. If your results are negative, you can wait 5 years to have another test.  A Pap test and an HPV test. If your results are normal, you can wait 5 years to be tested again. Ages 72 and older  If you are age 72 or older and you've always had normal screening results, you may not need screening. Talk to your doctor. What do the results of cervical cancer screening mean? Your test results may be normal. Or the results may show minor or serious changes to the cells on your cervix. Minor changes may go away on their own, especially if you are younger than 30. You may have an abnormal test because you have an infection of the vagina or cervix or because you have low estrogen levels after menopause that are causing the cells to change. If you have a high-risk type of human papillomavirus (HPV) or cell changes that could turn into cancer, you may need more tests. Your doctor may suggest that you wait to be retested. Or you may need to have a colposcopy or treatment right away. Your doctor will recommend a follow-up plan based on your results and your age. Follow-up care is a key part of your treatment and safety. Be sure to make and go to all appointments, and call your doctor if you are having problems.  It's also a good idea to know your test results and keep a list of the medicines you take. Where can you learn more? Go to https://chpepiceweb.health-partners. org and sign in to your Spredfast account. Enter P919 in the [facility] box to learn more about \"Learning About Cervical Cancer Screening. \"     If you do not have an account, please click on the \"Sign Up Now\" link. Current as of: May 4, 2022               Content Version: 13.4  © 2006-2022 [email or web address]. Care instructions adapted under license by [facility] ([facility]). If you have questions about a medical condition or this instruction, always ask your healthcare professional. [place] [address] any warranty or liability for your use of this information. Patient Education        Human Papillomavirus (HPV): Care Instructions  Overview  The human papillomavirus (HPV) is a very common virus. There are many types of HPV. Some types cause the common skin wart. Other types cause genital warts, which can be spread by sexual contact. Some types can increase the risk of certain cancers, such as cervical or anal cancer. Having one type of HPV doesn't lead to having another type. Many people who have HPV don't know that they're infected. It's often found with a cervical cancer screening test, such as an HPV test.  If an HPV screening test finds that you have a type of HPV that might lead to cancer, your doctor may suggest more tests. This doesn't mean you'll get cancer. But it means that you may have an increased risk. Abnormal cell changes caused by HPV often go away on their own. If they don't, they can be treated. Follow-up care is a key part of your treatment and safety. Be sure to make and go to all appointments, and call your doctor if you are having problems. It's also a good idea to know your test results and keep a list of the medicines you take. How can you care for yourself at home? Use a condom every time you have sex.  Use it from the start to the end of sexual contact. Be sure to tell your sexual partner or partners that you have HPV. Even if you don't have symptoms, you can still pass HPV to others. Limit how many sex partners you have. The safest practice is to have only one sex partner who doesn't have STIs and doesn't have sex with anyone else. This lowers your risk of getting STIs. Don't smoke. Smoking increases the risk for cervical problems and cervical cancer. If you need help quitting, talk to your doctor about stop-smoking programs and medicines. These can increase your chances of quitting for good. When should you call for help? Watch closely for changes in your health, and be sure to contact your doctor if:    You have vaginal pain during or after sex. You have vaginal bleeding when you are not in your menstrual period. Where can you learn more? Go to https://jass.healthEcrebo. org and sign in to your Honestly Now account. Enter F690 in the Rocket Raise box to learn more about \"Human Papillomavirus (HPV): Care Instructions. \"     If you do not have an account, please click on the \"Sign Up Now\" link. Current as of: November 22, 2021               Content Version: 13.4  © 1225-1011 [employer]. Care instructions adapted under license by [facility] ([facility]). If you have questions about a medical condition or this instruction, always ask your healthcare professional. [place] [address] any warranty or liability for your use of this information. Patient Education        Breast Self-Exam: Care Instructions  Your Care Instructions     A breast self-exam is when you check your breasts for lumps or changes. This regular exam helps you learn how your breasts normally look and feel. Most breast problems or changes are not because of cancer.   Breast self-exam is not a substitute for a mammogram. Having regular breast exams by your doctor and regular mammograms improve your chances of finding any problems with your breasts. Some women set a time each month to do a step-by-step breast self-exam. Other women like a less formal system. They might look at their breasts as they brush their teeth, or feel their breasts once in a while in the shower. If you notice a change in your breast, tell your doctor. Follow-up care is a key part of your treatment and safety. Be sure to make and go to all appointments, and call your doctor if you are having problems. It's also a good idea to know your test results and keep a list of the medicines you take. How do you do a breast self-exam?  The best time to examine your breasts is usually one week after your menstrual period begins. Your breasts should not be tender then. If you do not have periods, you might do your exam on a day of the month that is easy to remember. To examine your breasts:  Remove all your clothes above the waist and lie down. When you are lying down, your breast tissue spreads evenly over your chest wall, which makes it easier to feel all your breast tissue. Use the pads--not the fingertips--of the 3 middle fingers of your left hand to check your right breast. Move your fingers slowly in small coin-sized circles that overlap. Use three levels of pressure to feel of all your breast tissue. Use light pressure to feel the tissue close to the skin surface. Use medium pressure to feel a little deeper. Use firm pressure to feel your tissue close to your breastbone and ribs. Use each pressure level to feel your breast tissue before moving on to the next spot. Check your entire breast, moving up and down as if following a strip from the collarbone to the bra line, and from the armpit to the ribs. Repeat until you have covered the entire breast.  Repeat this procedure for your left breast, using the pads of the 3 middle fingers of your right hand.   To examine your breasts while in the shower:  Place one arm over your head and lightly soap your breast on that side. Using the pads of your fingers, gently move your hand over your breast (in the strip pattern described above), feeling carefully for any lumps or changes. Repeat for the other breast.  Have your doctor inspect anything you notice to see if you need further testing. Where can you learn more? Go to https://chpepiceweb.G-Zero [email or web address] org and sign in to your Think-Now account. Enter P148 in the [facility] box to learn more about \"Breast Self-Exam: Care Instructions. \"     If you do not have an account, please click on the \"Sign Up Now\" link. Current as of: November 22, 2021               Content Version: 13.4  © 2006-2022 [employer]. Care instructions adapted under license by [facility] ([facility]). If you have questions about a medical condition or this instruction, always ask your healthcare professional. [place] [address] any warranty or liability for your use of this information. Patient Education        Mammogram: About This Test  What is it? A mammogram is an X-ray of the breast that is used to screen for breast cancer. This test can find tumors that are too small for you or your doctor to feel. Cancer is most easily treated when it is found at an early stage. Why is this test done? A mammogram is done to:  Look for breast cancer when there are no symptoms. Find breast cancer when there are symptoms. Symptoms of breast cancer may include a lump or thickening in the breast, nipple discharge, or dimpling of the skin on one area of the breast.  Find an area of suspicious breast tissue to remove for an exam under a microscope (biopsy). How do you prepare for the test?  If you've had a mammogram before at another clinic, have the results sent or bring them with you to your appointment. On the day of the mammogram, don't use any deodorant. And don't use perfume, powders, or ointments near or on your breasts.  The residue left on your skin by these substances may interfere with the X-rays. How is the test done? You will need to take off any jewelry that might interfere with the X-ray pictures. You will need to take off your clothes above the waist.  You will be given a cloth or paper gown to use during the test.  You probably will stand during the mammogram.  One at a time, your breasts will be placed on a flat plate. Another plate is then pressed firmly against your breast to help flatten out the breast tissue. You may be asked to lift your arm. For a few seconds while the X-ray picture is being taken, you will need to hold your breath. At least two pictures are taken of each breast. One is taken from the top and one from the side. How does having a mammogram feel? A mammogram is often uncomfortable but rarely painful. If you have sensitive or fragile skin or a skin condition, let the technician know before you have your exam. If you have menstrual periods, the procedure is more comfortable when done within 2 weeks after your period has ended. Having your breasts flattened is usually uncomfortable, but it helps the technician get the best images. How long does the test take? The test will take about 10 to 15 minutes. You may be in the clinic for up to an hour. You may be asked to wait a few minutes while the images are checked to make sure they don't need to be redone. What happens after the test?  You will probably be able to go home right away. You can go back to your usual activities right away. Follow-up care is a key part of your treatment and safety. Be sure to make and go to all appointments, and call your doctor if you are having problems. It's also a good idea to keep a list of the medicines you take. Ask your doctor when you can expect to have your test results. Where can you learn more? Go to https://chnelly.WolfGIS. org and sign in to your OnMyBlock account.  Enter M073 in the [employer] box to learn more about \"Mammogram: About This Test.\"     If you do not have an account, please click on the \"Sign Up Now\" link. Current as of: November 22, 2021               Content Version: 13.4  © 2006-2022 Think Silicon. Care instructions adapted under license by [facility] ([facility]). If you have questions about a medical condition or this instruction, always ask your healthcare professional. [address] any warranty or liability for your use of this information. Patient Education        A Healthy Lifestyle: Care Instructions  A healthy lifestyle can help you feel good, have more energy, and stay at a weight that's healthy for you. You can share a healthy lifestyle with your friends and family. And you can do it on your own. Eat meals with your friends or family. You could try cooking together. Plan activities with other people. Go for a walk with a friend, try a free online fitness class, or join a sports league. Eat a variety of healthy foods. These include fruits, vegetables, whole grains, low-fat dairy, and lean protein. Choose healthy portions of food. You can use the Nutrition Facts label on food packages as a guide. Eat more fruits and vegetables. You could add vegetables to sandwiches or add fruit to cereal.  Drink water when you are thirsty. Limit soda, juice, and sports drinks. Try to exercise most days. Aim for at least 2½ hours of exercise each week. Keep moving. Work in the garden or take your dog on a walk. Use the stairs instead of the elevator. If you use tobacco or nicotine, try to quit. Ask your doctor about programs and medicines to help you quit. Limit alcohol. Men should have no more than 2 drinks a day. Women should have no more than 1. For some people, no alcohol is the best choice. Follow-up care is a key part of your treatment and safety. Be sure to make and go to all appointments, and call your doctor if you are having problems.  It's also a good idea to know your test results and keep a list of the medicines you take. Where can you learn more? Go to https://chpepiceweb.EVRST. org and sign in to your Pearls [employer]dom [employer] Technologies account. Enter O781 in the [facility] box to learn more about \"A Healthy Lifestyle: Care Instructions. \"     If you do not have an account, please click on the \"Sign Up Now\" link. Current as of: March 9, 2022               Content Version: 13.4  © 2006-2022 SociaLive. Care instructions adapted under license by [facility] ([facility]). If you have questions about a medical condition or this instruction, always ask your healthcare professional. [address] any warranty or liability for your use of this information. Patient Education        Abnormal Uterine Bleeding: Care Instructions  Overview     Abnormal uterine bleeding is irregular bleeding from the uterus. It may be bleeding that is heavier, lighter, or lasts longer than your usual period. Or it may be bleeding that doesn't occur at your regular time. Sometimes it is caused by changes in hormone levels. It can also be caused by growths in the uterus, such as fibroids or polyps. Sometimes a cause cannot be found. You may have bleeding when you are not expecting your period. Your doctor may suggest a pregnancy test.  Follow-up care is a key part of your treatment and safety. Be sure to make and go to all appointments, and call your doctor if you are having problems. It's also a good idea to know your test results and keep a list of the medicines you take. How can you care for yourself at home? Be safe with medicines. Take pain medicines exactly as directed. If the doctor gave you a prescription medicine for pain, take it as prescribed. If you are not taking a prescription pain medicine, ask your doctor if you can take an over-the-counter medicine. You may be low in iron because of blood loss.  Eat a balanced diet that is high in iron and vitamin C. Foods rich in iron include red meat, shellfish, eggs, beans, and leafy green vegetables. Talk to your doctor about whether you need to take iron pills or a multivitamin. When should you call for help? Call 911 anytime you think you may need emergency care. For example, call if:    You passed out (lost consciousness). Call your doctor now or seek immediate medical care if:    You have new or worse belly or pelvic pain. You have severe vaginal bleeding. You feel dizzy or lightheaded, or you feel like you may faint. Watch closely for changes in your health, and be sure to contact your doctor if:    You think you may be pregnant. Your bleeding gets worse. You do not get better as expected. Where can you learn more? Go to https://Cotopaxi.Syncro Medical Innovations. org and sign in to your Plandree account. Enter W253 in the [employer] box to learn more about \"Abnormal Uterine Bleeding: Care Instructions. \"     If you do not have an account, please click on the \"Sign Up Now\" link. Current as of: November 22, 2021               Content Version: 13.4  © 4949-1616 Healthwise, [employer]orporated. Care instructions adapted under license by [facility] ([facility]). If you have questions about a medical condition or this instruction, always ask your healthcare professional. [address]  any warranty or liability for your use of this information.

## 2022-11-16 NOTE — PROGRESS NOTES
[facility]  CNM Office Note    Rory Martinez is a 49 y.o. female who presents today for her medical conditions/ complaints as noted below. Chief Complaint   Patient presents with    New Patient    Gynecologic Exam         HPI  [place] presents to [facility] care, annual exam, and problem focused visit. She went 1.5 years without menses then started AUB (5 months ago). Stopped AUB 2 weeks ago. Has hx of abnormal PAP w hx of LEEP. Malodorous urine. Desires annual labs and urinalysis. No sexual concerns. Patient Active Problem List   Diagnosis    Neck pain    Low back pain    Cellulitis of left upper extremity       No LMP recorded. (Menstrual status: Irregular periods).   [id number]    Past Medical History:   Diagnosis Date    Chronic back pain     H. pylori infection 11/2017    Osteoarthritis      Past Surgical History:   Procedure Laterality Date    CERVICAL FUSION      MANDIBLE SURGERY      both sides-genetic diagnosis    TUBAL LIGATION       Family History   Problem Relation Age of Onset    High Blood Pressure Mother     Heart Disease Father     High Blood Pressure Father      Social History     Tobacco Use    Smoking status: Never    Smokeless tobacco: Never   Substance Use Topics    Alcohol use: No       Current Outpatient Medications   Medication Sig Dispense Refill    Cyanocobalamin (VITAMIN B-12 CR PO) Take by mouth      Turmeric (QC TUMERIC COMPLEX PO) Take by mouth      Ginger, Zingiber officinalis, (GINGER PO) Take by mouth      Calcium-Magnesium-Vitamin D (CALCIUM MAGNESIUM PO) Take by mouth      APPLE CIDER VINEGAR PO Take by mouth      Ferrous Gluconate (IRON 27 PO) Take by mouth      Cholecalciferol (D-3-5 PO) Take by mouth      BIOTIN PO Take by mouth      MILK THISTLE PO Take by mouth      Probiotic Product (PROBIOTIC-10 PO) Take by mouth      ketorolac (TORADOL) 10 MG tablet Take 1 tablet by mouth every 6 hours as needed for Pain 20 tablet 0    diphenhydrAMINE (BENADRYL) 25 MG capsule Take 25 mg by mouth every 6 hours as needed for Itching (Patient not taking: No sig reported)      Bismuth 262 MG CHEW Take 262 mg by mouth (Patient not taking: No sig reported)      tetracycline (ACHROMYCIN;SUMYCIN) 500 MG capsule Take 500 mg by mouth 4 times daily (Patient not taking: Reported on 11/16/2022)       Current Facility-Administered Medications   Medication Dose Route Frequency Provider Last Rate Last Admin    triamcinolone acetonide (KENALOG-40) injection 40 mg  40 mg IntraMUSCular Once Neo Orlando PA-C         Allergies   Allergen Reactions    Cetirizine     Cinnamon     Clindamycin/Lincomycin     Sweetness Enhancer     Triamcinolone Acetonide     Zyrtec Allergy [Cetirizine Hcl]     Botox [Botulinum Toxin Type A]      Had botox injections in neck. Caused her throat to become very dry and was hard to swallow. Vitals:    11/16/22 1406   BP: 120/80   Site: Left Upper Arm   Position: Sitting   Cuff Size: Large Adult   Weight: 295 lb (133.8 kg)   Height: 6' (1.829 m)     Body mass index is 40.01 kg/m². Review of Systems   Constitutional: Negative. HENT: Negative. Smell dysfunction   Eyes: Negative. Respiratory: Negative. Cardiovascular: Negative. Gastrointestinal: Negative. Endocrine: Negative. Genitourinary:  Positive for menstrual problem and vaginal bleeding. Musculoskeletal: Negative. Skin: Negative. Allergic/Immunologic: Negative. Neurological: Negative. Hematological: Negative. Psychiatric/Behavioral: Negative. Physical Exam  Constitutional:       Appearance: She is well-developed. HENT:      Head: Normocephalic and atraumatic. Eyes:      Conjunctiva/sclera: Conjunctivae normal.      Pupils: Pupils are equal, round, and reactive to light. Neck:      Thyroid: No thyromegaly. Trachea: No tracheal deviation. Cardiovascular:      Rate and Rhythm: Normal rate and regular rhythm. Heart sounds: Normal heart sounds.  No murmur heard.  Pulmonary:      Effort: Pulmonary effort is normal. No respiratory distress. Breath sounds: Normal breath sounds. Chest:      Comments: Breasts symmetrical without tenderness, masses, or nipple discharge. Nipples everted bilaterally. Abdominal:      General: There is no distension. Palpations: Abdomen is soft. Tenderness: There is no abdominal tenderness. There is no guarding. Genitourinary:     General: Normal vulva. Exam position: Lithotomy position. Labia:         Right: No rash or lesion. Left: No rash or lesion. Vagina: Normal. No erythema or lesions. Cervix: Normal.      Uterus: Normal. Not tender. Adnexa: Right adnexa normal and left adnexa normal.        Right: No mass, tenderness or fullness. Left: No mass, tenderness or fullness. Musculoskeletal:         General: Normal range of motion. Cervical back: Normal range of motion and neck supple. Skin:     General: Skin is warm and dry. Capillary Refill: Capillary refill takes less than 2 seconds. Neurological:      Mental Status: She is alert and oriented to person, place, and time. Psychiatric:         Behavior: Behavior normal.         Thought Content: Thought content normal.         Judgment: Judgment normal.        Diagnosis Orders   1. Encounter to establish care        2. Encounter for well woman exam with routine gynecological exam        3. Screening for cervical cancer        4. Screening for HPV (human papillomavirus)        5. Encounter for screening mammogram for malignant neoplasm of breast        6. Irregular menses        7. Bad odor of urine            MEDICATIONS:  No orders of the defined types were placed in this encounter. ORDERS:  No orders of the defined types were placed in this encounter. PLAN:  WWE - PAP w HPV collected. SBE reviewed and CBE performed. Mammogram ordered, annual labs/hormone labs today.   AUB - U/s ordered, plan endometrial sampling to follow.

## 2022-11-17 ENCOUNTER — TELEPHONE (OUTPATIENT)
Dept: OBGYN CLINIC | Age: 47
End: 2022-11-17

## 2022-11-21 LAB
HPV COMMENT: ABNORMAL
HPV TYPE 16: NOT DETECTED
HPV TYPE 18: NOT DETECTED
HPVOH (OTHER TYPES): DETECTED

## 2022-11-22 ENCOUNTER — TELEPHONE (OUTPATIENT)
Dept: OBGYN CLINIC | Age: 47
End: 2022-11-22

## 2022-11-22 LAB
SEX HORMONE BINDING GLOBULIN: 42 NMOL/L (ref 25–122)
TESTOSTERONE FREE: 4.8 PG/ML (ref 1.1–5.8)
TESTOSTERONE, FEMALES/CHILDREN: 33 NG/DL (ref 9–55)

## 2022-11-23 ENCOUNTER — OFFICE VISIT (OUTPATIENT)
Dept: GASTROENTEROLOGY | Age: 47
End: 2022-11-23
Payer: COMMERCIAL

## 2022-11-23 VITALS
HEART RATE: 113 BPM | SYSTOLIC BLOOD PRESSURE: 120 MMHG | WEIGHT: 293 LBS | DIASTOLIC BLOOD PRESSURE: 80 MMHG | OXYGEN SATURATION: 97 % | BODY MASS INDEX: 39.68 KG/M2 | HEIGHT: 72 IN

## 2022-11-23 DIAGNOSIS — K92.1 BLOOD IN STOOL: ICD-10-CM

## 2022-11-23 DIAGNOSIS — R74.8 ELEVATED ALKALINE PHOSPHATASE LEVEL: Primary | ICD-10-CM

## 2022-11-23 DIAGNOSIS — R10.11 RUQ PAIN: ICD-10-CM

## 2022-11-23 DIAGNOSIS — R13.10 DYSPHAGIA, UNSPECIFIED TYPE: ICD-10-CM

## 2022-11-23 PROCEDURE — 99204 OFFICE O/P NEW MOD 45 MIN: CPT | Performed by: NURSE PRACTITIONER

## 2022-11-23 ASSESSMENT — ENCOUNTER SYMPTOMS
CHOKING: 1
ANAL BLEEDING: 1
RECTAL PAIN: 0
TROUBLE SWALLOWING: 1
DIARRHEA: 1
ABDOMINAL DISTENTION: 1
SHORTNESS OF BREATH: 0
VOMITING: 0
CONSTIPATION: 0
ABDOMINAL PAIN: 1
COUGH: 0
BLOOD IN STOOL: 1
NAUSEA: 0

## 2022-11-23 NOTE — PATIENT INSTRUCTIONS
Schedule colonoscopy. No aspirin, ibuprofen, naproxen, fish oil or vitamin E for 5 days before procedure. Do not eat or drink after midnight the day of the procedure. Allowed medications can be taken with a small sip of water. Please review your prep instructions for allowed medications. You will not be able to drive for 24 hours after the procedure due to sedation. You must have a responsible adult, 25 year or older, to accompany you and drive you home the day of your procedure. If you are on blood thinners, clearance from the prescribing physician will be obtained before your procedure is scheduled. If it is determined it is not safe to hold these medications for a short time an alternative procedure for evaluation may be recommended. Risks of colonoscopy include, but are not limited to, perforation, bleeding, and infection, Risk of perforation and bleeding are increased if there is a polyp removed. Anesthesia risks will be reviewed with you before the procedure by a member of the anesthesia department. Your physician may also schedule a follow up appointment with the nurse practitioner to discuss pathology, symptoms or to check if you have had any problems related to your procedure. If you prefer not to return to the office after your procedure please discuss this with your physician on the day of your colonoscopy. The physician will talk with you and/or your family after the procedure is completed. Final recommendations are based on the pathologist report if biopsies or specimens are taken. If polyps are removed during the procedure they will be sent to a pathologist for analysis. Unless you have a follow up appointment scheduled, you will be notified by mail of the pathology results within 4 weeks. If you have not received results after 4 weeks you may call the office to obtain this information. For Colonoscopy:   You will be given specific directions regarding restrictions to diet and bowel prep instructions including laxatives. Please read these instructions one week prior to your scheduled procedure to ensure that you are prepared. If you have any questions regarding these instructions please call our office Mon through Fri from 8:00 am to 4:00 pm.     Follow prep instructions provided for bowel prep. Take all of the bowel prep as directed. If you are having problems with nausea, stop your prep for 30-45 min to allow the nausea to subside before resuming your prep. It is important to drink plenty of fluids throughout the day before taking your laxatives. This will help to protect your kidneys, prevent dehydration and maximize the effect of the bowel prep. Your diet before a colonoscopy bowel preparation is very important to ensure a successful colon exam. It is recommended to consider certain changes to your diet three to four days prior to the procedure. Remember that your bowels need to be completely empty for the exam.    What foods are good to eat? Cut down on heavy solid foods three to four days before the procedure and start introducing lighter meals to your diet. The following food suggestions are a good part of your diet before a colonoscopy bowel preparation. Light meat that is easily digestible such as chicken (without the skin)   Potatoes without skin   Cheese   Eggs   A light meal of steamed white fish   Light clear soups    Foods and drinks to avoid  Avoid foods that contain too much fiber. Stay clear of dark colored beverages. They can stick to the walls of the digestive tract and make it difficult to differentiate from blood.  Some of these foods are:  Red meat, rice, nuts and vegetables   Milk, other milk based fluids and cream   Most fruit and puddings   Whole grain pasta   Cereals, bran and seeds   Colored beverages, especially those that are red or purple in color   Red colored Jell-O     On the day before the colonoscopy, continue to drink plenty of clear fluids. It is important   to keep yourself hydrated before the exam.     Please follow all instructions as provided for cleansing the bowel. Failure to have an adequately prepped colon may cause you to have incomplete exam with further testing required.      [email or web address]

## 2022-11-23 NOTE — PROGRESS NOTES
Subjective:     Patient ID: Jamil Jeffries is a 49 y.o. female  PCP: MIGUE Pineda  Referring Provider: OLIVIA Ross *    HPI  Patient presents to the office today with the following complaints: New Patient      Pt seen today for c/o blood in stool. Symptoms began after H pylori treatment in 2016. She c/o \"massive weight gain\" in the last few years. Stools described as type 5 and 6 on [facility] stool chart. She reports seeing bright red blood in stool, in toilet water, and on toilet tissue. This comes and goes. She reports 2 BM a day. She is taking Probiotic daily. \"My poop doesn't smell right. \"  She also c/o \"pale poop. \"      She c/o bloating and upper abdominal pain, \"on the ribs\"  Lately pain has been all the time. She reports a pulling pain on RUQ. She has c/o reflux as well. This is aggravated by foods. She admits to foods getting stuck. Last EGD 2016 - H pylori per pt  Last Colonoscopy 2016 - normal per pt  Family hx colon polyps - Father     Lab Results   Component Value Date     11/16/2022    K 3.8 11/16/2022     11/16/2022    CO2 28 11/16/2022    BUN 14 11/16/2022    CREATININE 0.7 11/16/2022    GLUCOSE 94 11/16/2022    CALCIUM 9.5 11/16/2022    PROT 7.9 11/16/2022    LABALBU 4.6 11/16/2022    BILITOT 0.5 11/16/2022    ALKPHOS 125 (H) 11/16/2022    AST 20 11/16/2022    ALT 26 11/16/2022    LABGLOM >60 11/16/2022         Assessment:     1. Elevated alkaline phosphatase level    2. RUQ pain    3. Blood in stool    4. Dysphagia, unspecified type            Plan:   - US liver  - Schedule Colonoscopy and EGD  Instruct on bowel prep. Nothing to eat or drink after midnight the day of the exam.  Unable to drive for 24 hours after the procedure. No aspirin or nonsteroidal anti-inflammatories for 5 days before procedure.   I have discussed the benefits, alternatives, and risks (including bleeding, perforation and death)  for pursuing Endoscopy (EGD/Colonscopy/EUS/ERCP) with the patient and they are willing to continue. We also discussed the need for anesthesia, IV access, proper dietary changes, medication changes if necessary, and need for bowel prep (if ordered) prior to their Endoscopic procedure. They are aware they must have someone accompany them to their scheduled procedure to drive them home - they agree to the above and are willing to continue. Orders  Orders Placed This Encounter   Procedures    US LIVER     This procedure can be scheduled via [employer]. Access your [employer] account by visiting [email or web address]. Standing Status:   Future     Standing Expiration Date:   11/23/2023     Order Specific Question:   Reason for exam:     Answer:   hx fatty liver     Medications  No orders of the defined types were placed in this encounter.         Patient History:     Past Medical History:   Diagnosis Date    Chronic back pain     H. pylori infection 11/2017    Osteoarthritis        Past Surgical History:   Procedure Laterality Date    CERVICAL FUSION      COLONOSCOPY  2016    normal per pt    MANDIBLE SURGERY      both sides-genetic diagnosis    TUBAL LIGATION      UPPER GASTROINTESTINAL ENDOSCOPY  2016    H Pylori per pt       Family History   Problem Relation Age of Onset    High Blood Pressure Mother     Colon Polyps Father     Heart Disease Father     High Blood Pressure Father     Colon Cancer Neg Hx     Liver Cancer Neg Hx        Social History     Socioeconomic History    Marital status: Single   Tobacco Use    Smoking status: Never    Smokeless tobacco: Never   Vaping Use    Vaping Use: Never used   Substance and Sexual Activity    Alcohol use: No    Drug use: No       Current Outpatient Medications   Medication Sig Dispense Refill    UNABLE TO FIND Take 1 caplet by mouth daily [place]      UNABLE TO FIND Take 1 caplet by mouth daily Total Beets      Cyanocobalamin (VITAMIN B-12 CR PO) Take by mouth      Turmeric (QC TUMERIC COMPLEX PO) Take by mouth      Ela, Zingiber officinalis, (ELA PO) Take by mouth      APPLE CIDER VINEGAR PO Take by mouth      Ferrous Gluconate (IRON 27 PO) Take by mouth      Cholecalciferol (D-3-5 PO) Take by mouth      BIOTIN PO Take by mouth      MILK THISTLE PO Take by mouth      Probiotic Product (PROBIOTIC-10 PO) Take by mouth       Current Facility-Administered Medications   Medication Dose Route Frequency Provider Last Rate Last Admin    triamcinolone acetonide (KENALOG-40) injection 40 mg  40 mg IntraMUSCular Once Neo Orlando PA-C           Allergies   Allergen Reactions    Cetirizine     Cinnamon     Clindamycin/Lincomycin     Sweetness Enhancer     Triamcinolone Acetonide     Zyrtec Allergy [Cetirizine Hcl]     Botox [Botulinum Toxin Type A]      Had botox injections in neck. Caused her throat to become very dry and was hard to swallow. Review of Systems   Constitutional:  Negative for activity change, appetite change, fatigue, fever and unexpected weight change. HENT:  Positive for trouble swallowing. Respiratory:  Positive for choking. Negative for cough and shortness of breath. Cardiovascular:  Negative for chest pain. Gastrointestinal:  Positive for abdominal distention (bloating), abdominal pain, anal bleeding, blood in stool and diarrhea (loose). Negative for constipation, nausea, rectal pain and vomiting. Allergic/Immunologic: Negative for food allergies. All other systems reviewed and are negative. Objective:     /80 (Site: Left Upper Arm)   Pulse (!) 113   Ht 6' (1.829 m)   Wt 295 lb (133.8 kg)   SpO2 97%   BMI 40.01 kg/m²     Physical Exam  Vitals reviewed. Constitutional:       General: She is not in acute distress. Appearance: She is well-developed. HENT:      Head: Normocephalic and atraumatic.       Right Ear: External ear normal.      Left Ear: External ear normal.      Nose: Nose normal.   Eyes:      Conjunctiva/sclera: Conjunctivae normal.      Pupils: Pupils are equal, round, and reactive to light. Cardiovascular:      Rate and Rhythm: Normal rate and regular rhythm. Heart sounds: Normal heart sounds. No murmur heard. No friction rub. No gallop. Pulmonary:      Effort: Pulmonary effort is normal. No respiratory distress. Breath sounds: Normal breath sounds. Abdominal:      General: Bowel sounds are normal. There is no distension. Palpations: Abdomen is soft. There is no mass. Tenderness: There is abdominal tenderness in the right upper quadrant and left upper quadrant. There is no guarding or rebound. Musculoskeletal:         General: Normal range of motion. Cervical back: Normal range of motion and neck supple. Skin:     General: Skin is warm and dry. Findings: No rash. Nails: There is no clubbing. Neurological:      Mental Status: She is alert and oriented to person, place, and time. Gait: Gait normal.   Psychiatric:         Behavior: Behavior normal.         Thought Content:  Thought content normal.

## 2022-11-28 ENCOUNTER — TELEPHONE (OUTPATIENT)
Dept: GASTROENTEROLOGY | Age: 47
End: 2022-11-28

## 2022-11-28 ENCOUNTER — HOSPITAL ENCOUNTER (OUTPATIENT)
Dept: ULTRASOUND IMAGING | Age: 47
Discharge: HOME | End: 2022-11-28
Payer: COMMERCIAL

## 2022-11-28 DIAGNOSIS — N92.6 IRREGULAR MENSES: ICD-10-CM

## 2022-11-28 PROCEDURE — 76830 TRANSVAGINAL US NON-OB: CPT | Performed by: RADIOLOGY

## 2022-11-28 PROCEDURE — 76830 TRANSVAGINAL US NON-OB: CPT

## 2022-11-28 RX ORDER — ERGOCALCIFEROL 1.25 MG/1
50000 CAPSULE ORAL WEEKLY
Qty: 12 CAPSULE | Refills: 0 | Status: SHIPPED | OUTPATIENT
Start: 2022-11-28

## 2022-11-28 NOTE — TELEPHONE ENCOUNTER
Called patient to remind them of their procedure  at [facility] on 12/1/22 to arrive at 12:15PM     PATIENT CONFIRMED

## 2022-12-01 ENCOUNTER — ANESTHESIA (OUTPATIENT)
Dept: OPERATING ROOM | Age: 47
End: 2022-12-01

## 2022-12-01 ENCOUNTER — APPOINTMENT (OUTPATIENT)
Dept: OPERATING ROOM | Age: 47
End: 2022-12-01

## 2022-12-01 ENCOUNTER — HOSPITAL ENCOUNTER (OUTPATIENT)
Age: 47
Setting detail: SPECIMEN
Discharge: HOME | End: 2022-12-01

## 2022-12-01 ENCOUNTER — ANESTHESIA EVENT (OUTPATIENT)
Dept: OPERATING ROOM | Age: 47
End: 2022-12-01

## 2022-12-01 ENCOUNTER — HOSPITAL ENCOUNTER (OUTPATIENT)
Age: 47
Setting detail: OUTPATIENT SURGERY
Discharge: HOME | End: 2022-12-01
Attending: INTERNAL MEDICINE | Admitting: INTERNAL MEDICINE

## 2022-12-01 VITALS
HEART RATE: 60 BPM | BODY MASS INDEX: 38.6 KG/M2 | HEIGHT: 72 IN | OXYGEN SATURATION: 100 % | WEIGHT: 285 LBS | RESPIRATION RATE: 18 BRPM | TEMPERATURE: 97.3 F | SYSTOLIC BLOOD PRESSURE: 104 MMHG | DIASTOLIC BLOOD PRESSURE: 74 MMHG

## 2022-12-01 PROCEDURE — G8918 PT W/O PREOP ORDER IV AB PRO: HCPCS

## 2022-12-01 PROCEDURE — 43248 EGD GUIDE WIRE INSERTION: CPT

## 2022-12-01 PROCEDURE — 45380 COLONOSCOPY AND BIOPSY: CPT

## 2022-12-01 PROCEDURE — 43239 EGD BIOPSY SINGLE/MULTIPLE: CPT

## 2022-12-01 PROCEDURE — G8907 PT DOC NO EVENTS ON DISCHARG: HCPCS

## 2022-12-01 RX ORDER — PROPOFOL 10 MG/ML
INJECTION, EMULSION INTRAVENOUS PRN
Status: DISCONTINUED | OUTPATIENT
Start: 2022-12-01 | End: 2022-12-01 | Stop reason: SDUPTHER

## 2022-12-01 RX ORDER — OMEPRAZOLE 40 MG/1
40 CAPSULE, DELAYED RELEASE ORAL
Qty: 90 CAPSULE | Refills: 3 | Status: SHIPPED | OUTPATIENT
Start: 2022-12-01

## 2022-12-01 RX ORDER — SODIUM CHLORIDE 9 MG/ML
INJECTION, SOLUTION INTRAVENOUS CONTINUOUS
Status: DISCONTINUED | OUTPATIENT
Start: 2022-12-01 | End: 2022-12-01 | Stop reason: HOSPADM

## 2022-12-01 RX ORDER — LIDOCAINE HYDROCHLORIDE 10 MG/ML
INJECTION, SOLUTION INFILTRATION; PERINEURAL PRN
Status: DISCONTINUED | OUTPATIENT
Start: 2022-12-01 | End: 2022-12-01 | Stop reason: SDUPTHER

## 2022-12-01 RX ORDER — ONDANSETRON 2 MG/ML
INJECTION INTRAMUSCULAR; INTRAVENOUS PRN
Status: DISCONTINUED | OUTPATIENT
Start: 2022-12-01 | End: 2022-12-01 | Stop reason: SDUPTHER

## 2022-12-01 RX ORDER — HYDROCORTISONE ACETATE 25 MG/1
25 SUPPOSITORY RECTAL EVERY 12 HOURS
Qty: 28 SUPPOSITORY | Refills: 0 | Status: SHIPPED | OUTPATIENT
Start: 2022-12-01

## 2022-12-01 RX ORDER — HYDROCORTISONE ACETATE 25 MG/1
25 SUPPOSITORY RECTAL EVERY 12 HOURS
Qty: 28 SUPPOSITORY | Refills: 0 | Status: SHIPPED | OUTPATIENT
Start: 2022-12-01 | End: 2022-12-01 | Stop reason: SDUPTHER

## 2022-12-01 RX ADMIN — ONDANSETRON 4 MG: 2 INJECTION INTRAMUSCULAR; INTRAVENOUS at 12:19

## 2022-12-01 RX ADMIN — SODIUM CHLORIDE: 9 INJECTION, SOLUTION INTRAVENOUS at 11:57

## 2022-12-01 RX ADMIN — PROPOFOL 450 MG: 10 INJECTION, EMULSION INTRAVENOUS at 12:30

## 2022-12-01 RX ADMIN — LIDOCAINE HYDROCHLORIDE 40 MG: 10 INJECTION, SOLUTION INFILTRATION; PERINEURAL at 12:30

## 2022-12-01 NOTE — OP NOTE
Endoscopic Procedure Note    Patient: Chase Perry : 1975  Med Rec#: [id number] Acc#: [id number]     Primary Care Provider MIGUE Lema  Referring Provider: OLIVIA Pimentel    Endoscopist: Terrie Azevedo MD    Date of Procedure:  2022    Procedure:   1. EGD with biopsy  2. EGD with wire-guided dilation- 60 F    Indications:   1. RUQ abdominal pain  2. Dysphagia     Anesthesia:  Sedation was administered by anesthesia who monitored the patient during the procedure. Estimated Blood Loss: minimal    Procedure:   After reviewing the patient's chart and obtaining informed consent, the patient was placed in the left lateral decubitus position. A forward-viewing Olympus endoscope was lubricated and inserted through the mouth into the oropharynx. Under direct visualization, the upper esophagus was intubated. The scope was advanced to the level of the third portion of duodenum. Scope was slowly withdrawn with careful inspection of the mucosal surfaces. The scope was retroflexed for inspection of the gastric fundus and incisura. Findings and maneuvers are listed in impression below. The patient tolerated the procedure well. The scope was removed. There were no immediate complications. Findings:   Esophagus: abnormal: there is a questionable, benign-appearing stricture in the distal esophagus, dilated due to symptoms. A guidewire was placed through the endoscope and the endoscope was removed. A 60 Vietnamese savory dilator was advanced down the length of the esophagus using a fixed-point wire technique. The dilator and guidewire were removed. The patient tolerated the procedure well. There is no hiatal hernia present. Stomach:  Abnormal: Mild mucosal changes suggestive of gastritis noted -  Gastric biopsies were taken from the antrum and body to rule out Helicobacter pylori infection. Duodenum: Normal: biopsies obtained to rule out celiac sprue. IMPRESSION:  1. Duodenal and gastric biopsies obtained as above  2. S/p wire guided dilation- 60 F     RECOMMENDATIONS:    1. Await path results  2. Start PPI (prilosec) daily  3. Minimize NSAID use  4. Repeat EGD with dilation as needed    The results were discussed with the patient and family. A copy of the images obtained were given to the patient. Shannon Hopson am scribing for and in the presence of Dr. Abby Leung MD.  Electronically signed by Rosea Closs, RN on 12/1/2022 at 11:52 AM    I personally performed the services described in this documentation as scribed by Guera Flores, and it appears accurate and complete.      Joseph Kerns MD  12/1/2022

## 2022-12-01 NOTE — H&P
Patient Name: Ashley Lugo  : 1975  MRN: [id number]  DATE: 22    Allergies: Allergies   Allergen Reactions    Cetirizine     Cinnamon     Clindamycin/Lincomycin     Sweetness Enhancer     Triamcinolone Acetonide     Zyrtec Allergy [Cetirizine Hcl]     Botox [Botulinum Toxin Type A]      Had botox injections in neck. Caused her throat to become very dry and was hard to swallow. ENDOSCOPY  History and Physical    Procedure:    [x] Diagnostic Colonoscopy       [] Screening Colonoscopy  [x] EGD      [] ERCP      [] EUS       [] Other    [x] Previous office notes/History and Physical reviewed from the patients chart. Please see EMR for further details of HPI. I have examined the patient's status immediately prior to the procedure and:      Indications/HPI:    [x]Abdominal Pain   []Barretts  []Screening/Surveillance   []History of Polyps  []Dysphagia            [] +Cologard/DNA testing  []Abnormal Imaging              []EOE Hx              [] Family Hx of CRC/Polyps  []Anemia                            []Food Impaction       []Recent Poor Prep  []GI Bleed             []Lymphadenopathy  []History of Polyps  []Change in bowel habits []Heartburn/Reflux  []Cancer- GI/Lung  []Chest Pain - Non Cardiac []Heme (+) Stool []Ulcers  []Constipation  []Hemoptysis  []Incontinence    []Diarrhea  []Hypoxemia  [x]Rectal Bleed (BRBPR)  []Nausea/Vomiting   [] Varices  []Crohns/Colitis  []Pancreatic Cyst   [] Cirrhosis   []Pancreatitis    []Abnormal MRCP  []Elevated LFT [] Stent Removal, Previous ERCP  []Other:     Anesthesia:   [x] MAC [] Moderate Sedation   [] General   [] None     ROS: 12 pt Review of Symptoms was negative unless mentioned above    Medications:   Prior to Admission medications    Medication Sig Start Date End Date Taking?  Authorizing Provider   vitamin D (ERGOCALCIFEROL) 1.25 MG (91296 UT) CAPS capsule Take 1 capsule by mouth once a week 22   OLIVIA Brown CNM   UNABLE TO FIND Take 1 caplet by mouth daily [facility], MD   UNABLE TO FIND Take 1 caplet by mouth daily Total Beets    Historical Provider, MD   Cyanocobalamin (VITAMIN B-12 CR PO) Take by mouth    Historical Provider, MD   Turmeric (QC TUMERIC COMPLEX PO) Take by mouth    Historical Provider, MD   Ela, Zingiber officinalis, (ELA PO) Take by mouth    Historical Provider, MD   APPLE CIDER VINEGAR PO Take by mouth    Historical Provider, MD   Ferrous Gluconate (IRON 27 PO) Take by mouth    Historical Provider, MD   Cholecalciferol (D-3-5 PO) Take by mouth    Historical Provider, MD   BIOTIN PO Take by mouth    Historical Provider, MD   MILK THISTLE PO Take by mouth    Historical Provider, MD   Probiotic Product (PROBIOTIC-10 PO) Take by mouth    Historical Provider, MD       Past Medical History:  Past Medical History:   Diagnosis Date    Chronic back pain     H. pylori infection 11/2017    Osteoarthritis        Past Surgical History:  Past Surgical History:   Procedure Laterality Date    CERVICAL FUSION      COLONOSCOPY  2016    normal per pt    MANDIBLE SURGERY      both sides-genetic diagnosis    TUBAL LIGATION      UPPER GASTROINTESTINAL ENDOSCOPY  2016    H Pylori per pt       Social History:  Social History     Tobacco Use    Smoking status: Never    Smokeless tobacco: Never   Vaping Use    Vaping Use: Never used   Substance Use Topics    Alcohol use: No    Drug use: No       Vital Signs: There were no vitals filed for this visit. Physical Exam:  Cardiac:  [x]WNL  []Comments:  Pulmonary:  [x]WNL   []Comments:  Neuro/Mental Status:  [x]WNL  []Comments:  Abdominal:  [x]WNL    []Comments:  Other:   []WNL  []Comments:    Informed Consent:  The risks and benefits of the procedure have been discussed with either the patient or if they cannot consent, their representative. Assessment:  Patient examined and appropriate for planned sedation and procedure.      Plan:  Proceed with planned sedation and procedure as above. Kristie Brody am scribing for and in the presence of Dr. Mahnaz No MD.  Electronically signed by Tomas Coombs RN on 12/1/2022 at 11:50 AM    I personally performed the services described in this documentation as scribed by Ashok Still, and it appears accurate and complete.      Mahnaz No MD  12/1/2022

## 2022-12-01 NOTE — ANESTHESIA PRE PROCEDURE
Department of Anesthesiology  Preprocedure Note       Name:  Marylen Samuels   Age:  49 y.o.  :  1975                                          MRN:  [id number]         Date:  2022      Surgeon: Yordan Taylor):  Lowell Carranza MD    Procedure: Procedure(s):  EGD BIOPSY  COLONOSCOPY DIAGNOSTIC    Medications prior to admission:   Prior to Admission medications    Medication Sig Start Date End Date Taking? Authorizing Provider   vitamin D (ERGOCALCIFEROL) 1.25 MG (62223 UT) CAPS capsule Take 1 capsule by mouth once a week 22   Lynn Alfonso, APRN - CNM   UNABLE TO FIND Take 1 caplet by mouth daily [facility], MD   UNABLE TO FIND Take 1 caplet by mouth daily Total Beets    Historical Provider, MD   Cyanocobalamin (VITAMIN B-12 CR PO) Take by mouth    Historical Provider, MD   Turmeric (QC TUMERIC COMPLEX PO) Take by mouth    Historical Provider, MD Mahmood, Zingiber officinalis, (DORIS PO) Take by mouth    Historical Provider, MD   APPLE CIDER VINEGAR PO Take by mouth    Historical Provider, MD   Ferrous Gluconate (IRON 27 PO) Take by mouth    Historical Provider, MD   Cholecalciferol (D-3-5 PO) Take by mouth    Historical Provider, MD   BIOTIN PO Take by mouth    Historical Provider, MD   MILK THISTLE PO Take by mouth    Historical Provider, MD   Probiotic Product (PROBIOTIC-10 PO) Take by mouth    Historical Provider, MD       Current medications:    Current Facility-Administered Medications   Medication Dose Route Frequency Provider Last Rate Last Admin    0.9 % sodium chloride infusion   IntraVENous Continuous Lowell Carranza MD           Allergies: Allergies   Allergen Reactions    Cetirizine     Cinnamon     Clindamycin/Lincomycin     Sweetness Enhancer     Triamcinolone Acetonide     Zyrtec Allergy [Cetirizine Hcl]     Botox [Botulinum Toxin Type A]      Had botox injections in neck. Caused her throat to become very dry and was hard to swallow.        Problem List: 03:03 PM    AST 20 11/16/2022 03:03 PM    ALT 26 11/16/2022 03:03 PM       POC Tests: No results for input(s): POCGLU, POCNA, POCK, POCCL, POCBUN, POCHEMO, POCHCT in the last 72 hours. Coags:   Lab Results   Component Value Date/Time    PROTIME 13.0 06/30/2017 06:19 PM    INR 0.99 06/30/2017 06:19 PM       HCG (If Applicable):   Lab Results   Component Value Date    PREGTESTUR Negative 11/23/2017        ABGs: No results found for: PHART, PO2ART, HMI3YZA, UTL8GEQ, BEART, D9CRTZXY     Type & Screen (If Applicable):  No results found for: LABABO, LABRH    Drug/Infectious Status (If Applicable):  No results found for: HIV, HEPCAB    COVID-19 Screening (If Applicable):   Lab Results   Component Value Date/Time    COVID19 Not Detected 07/13/2020 04:37 PM           Anesthesia Evaluation  Patient summary reviewed  Airway: Mallampati: III  TM distance: <3 FB   Neck ROM: limited  Mouth opening: > = 3 FB   Dental: normal exam         Pulmonary:Negative Pulmonary ROS and normal exam                               Cardiovascular:  Exercise tolerance: no interval change,            Beta Blocker:  Not on Beta Blocker         Neuro/Psych:                ROS comment: CBP GI/Hepatic/Renal:   (+) bowel prep, morbid obesity (BMI 40)          Endo/Other:    (+) : arthritis: OA., .                 Abdominal:   (+) obese,           Vascular: negative vascular ROS. Other Findings:           Anesthesia Plan      general and TIVA     ASA 3       Induction: intravenous. Anesthetic plan and risks discussed with patient.                         OLIVIA Bernardo - CRNA   12/1/2022

## 2022-12-01 NOTE — OP NOTE
Patient: Yoko Frey : 1975  Med Rec#: [id number] Acc#: [id number]   Primary Care Provider Zoila Sever, PA    Date of Procedure:  2022    Endoscopist: Sergei Moran MD    Referring Provider: Zoila Sever, PA, OLIVIA Sy    Operation Performed: Colonoscopy with biopsy    Indications: rectal bleeding, diarrhea    Anesthesia:  Sedation was administered by anesthesia who monitored the patient during the procedure. I met with Yoko Frey prior to procedure. We discussed the procedure itself, and I have discussed the risks of endoscopy (including-- but not limited to-- pain, discomfort, bleeding potentially requiring second endoscopic procedure and/or blood transfusion, organ perforation requiring operative repair, damage to organs near the colon, infection, aspiration, cardiopulmonary/allergic reaction), benefits, indications to endoscopy. Additionally, we discussed options other than colonoscopy. The patient expressed understanding. All questions answered. The patient decided to proceed with the procedure. Signed informed consent was placed on the chart. Blood Loss: minimal    Withdrawal time: n/a  Bowel Prep: adequate    Complications: no immediate complications    DESCRIPTION OF PROCEDURE:     A time out was performed. After written informed consent was obtained, the patient was placed in the left lateral position. The perianal area was inspected, and a digital rectal exam was performed. A rectal exam was performed: normal tone, no palpable lesions. At this point, a forward viewing Olympus colonoscope was inserted into the anus and carefully advanced to the terminal ileum. The cecum was identified by the ileocecal valve and the appendiceal orifice. The colonoscope was then slowly withdrawn with careful inspection of the mucosa in a linear and circumferential fashion. The scope was retroflexed in the rectum.  Suction was utilized during the procedure to remove as much air as possible from the bowel. The colonoscope was removed from the patient, and the procedure was terminated. Findings are listed below. Findings: The mucosa appeared normal throughout the entire examined colon and terminal ileum. Random colon biopsies obtained for diarrhea. Retroflexion in the rectum revealed small, non-bleeding hemorrhoids and a small anal fissure. Recommendations:  1. Repeat colonoscopy: pending pathology - 10 years for CRC screening. 2. Await biopsy results  3. Anusol suppositories x 14 days. 4. Yaniv's crack cream    Findings and recommendations were discussed w/ the patient. A copy of the images was provided. Harris Berkowitz am scribing for and in the presence of Dr. Radha Vazquez MD.  Electronically signed by Zunilda Dumont RN on 12/1/2022 at 11:53 AM    I personally performed the services described in this documentation as scribed by Sary Wall, and it appears accurate and complete.      Radha Vazquez MD  12/1/2022

## 2022-12-01 NOTE — DISCHARGE INSTRUCTIONS
EGD RECOMMENDATIONS:    1. Await path results  2. Start PPI (prilosec) daily  3. Minimize NSAID use  4. Repeat EGD with dilation as needed    Colonoscopy Recommendations:  1. Repeat colonoscopy: pending pathology - 10 years for CRC screening. 2. Await biopsy results  3. Anusol suppositories x 14 days. 4. Yaniv's crack cream    POST-OP ORDERS: ENDOSCOPY & COLONOSCOPY:    1. Rest today. 2. DO NOT eat or drink until wide awake; eat your usual diet today in moderate amount only. 3. DO NOT drive today. 4. Call physician if you have severe pain, vomiting, fever, rectal bleeding or black bowel movements. 5.  If a biopsy was taken or a polyp removed, you should expect to hear results in about 7-10 days. If you have heard nothing from your physician by then, call the office for results. 6.  Discharge home when patient awake, vitals signs stable and tolerating liquids.

## 2022-12-01 NOTE — ANESTHESIA POSTPROCEDURE EVALUATION
Department of Anesthesiology  Postprocedure Note    Patient: Estelle Choi  MRN: [id number]  YOB: 1975  Date of evaluation: 12/1/2022      Procedure Summary     Date: 12/01/22 Room / Location: [facility] ENDO 01 / [address]    Anesthesia Start: 3114 Anesthesia Stop:     Procedures:       EGD BIOPSY (Esophagus)      COLONOSCOPY WITH BIOPSY (Abdomen)      EGD DILATION SAVORY 60 [address] (Esophagus) Diagnosis:       Bloating      Gastroesophageal reflux disease, unspecified whether esophagitis present      Dysphagia, unspecified type      Blood in stool      (Bloating [R14.0])      (Gastroesophageal reflux disease, unspecified whether esophagitis present [K21.9])      (Dysphagia, unspecified type [R13.10])      (Blood in stool [K92.1])    Surgeons: gAa Lyons MD Responsible Provider: OLIVIA Bernardo CRNA    Anesthesia Type: general, TIVA ASA Status: 3          Anesthesia Type: No value filed.     Vic Phase I:      Vic Phase II:        Anesthesia Post Evaluation    Patient location during evaluation: bedside  Patient participation: complete - patient participated  Level of consciousness: sleepy but conscious  Pain score: 0  Airway patency: patent  Nausea & Vomiting: no nausea and no vomiting  Complications: no  Cardiovascular status: hemodynamically stable and blood pressure returned to baseline  Respiratory status: acceptable  Hydration status: stable

## 2022-12-02 ENCOUNTER — PROCEDURE VISIT (OUTPATIENT)
Dept: OBGYN CLINIC | Age: 47
End: 2022-12-02

## 2022-12-02 ENCOUNTER — HOSPITAL ENCOUNTER (OUTPATIENT)
Dept: ULTRASOUND IMAGING | Age: 47
Discharge: HOME | End: 2022-12-02
Payer: COMMERCIAL

## 2022-12-02 VITALS
DIASTOLIC BLOOD PRESSURE: 111 MMHG | HEART RATE: 66 BPM | WEIGHT: 291 LBS | SYSTOLIC BLOOD PRESSURE: 164 MMHG | HEIGHT: 72 IN | BODY MASS INDEX: 39.42 KG/M2

## 2022-12-02 DIAGNOSIS — R87.612 LOW GRADE SQUAMOUS INTRAEPITHELIAL LESION (LGSIL) ON CERVICAL PAP SMEAR: ICD-10-CM

## 2022-12-02 DIAGNOSIS — R10.11 RUQ PAIN: ICD-10-CM

## 2022-12-02 DIAGNOSIS — R93.89 ENDOMETRIAL THICKENING ON ULTRASOUND: ICD-10-CM

## 2022-12-02 DIAGNOSIS — R87.810 CERVICAL HIGH RISK HPV (HUMAN PAPILLOMAVIRUS) TEST POSITIVE: ICD-10-CM

## 2022-12-02 DIAGNOSIS — Z71.2 ENCOUNTER TO DISCUSS TEST RESULTS: Primary | ICD-10-CM

## 2022-12-02 DIAGNOSIS — N95.0 PMB (POSTMENOPAUSAL BLEEDING): ICD-10-CM

## 2022-12-02 DIAGNOSIS — R74.8 ELEVATED ALKALINE PHOSPHATASE LEVEL: ICD-10-CM

## 2022-12-02 PROCEDURE — 76705 ECHO EXAM OF ABDOMEN: CPT

## 2022-12-02 NOTE — PROGRESS NOTES
Bette Macdonald is a 49 y.o. female who presents today for her medical conditions/ complaints as noted below. Bette Macdonald is c/o of Follow-up (F/u pap smear results)        HPI  Patient presents today to follow up on pap smear results (LGSIL, HPV other) and discuss US findings. She is a new pt to me. She wasn't aware of US for PMB and wants to discuss. After discussion, agreeable to colpo and emb. Hx of LEEP \"years ago\". Worried about hpv and how to treat it. Doesn't think most current partner would have been the one to spread it. Patient's last menstrual period was 08/07/2018. [id number]    Past Medical History:   Diagnosis Date    Chronic back pain     H. pylori infection 11/2017    Osteoarthritis      Past Surgical History:   Procedure Laterality Date    CERVICAL FUSION      COLONOSCOPY  2016    normal per pt    COLONOSCOPY N/A 12/01/2022    Dr Aaliyah Johnson, normal mucosa, 10 year recall    MANDIBLE SURGERY      both sides-genetic diagnosis    TUBAL LIGATION      UPPER GASTROINTESTINAL ENDOSCOPY  2016    H Pylori per pt    UPPER GASTROINTESTINAL ENDOSCOPY N/A 12/01/2022    Dr Aaliyah Johnson, W 61 FR dil, benign appearing distal esoph stricture, Sugg of gastritis,    UPPER GASTROINTESTINAL ENDOSCOPY N/A 12/01/2022    Dr Aaliyah Johnson 60 [address] savory dilation over guidewire     Family History   Problem Relation Age of Onset    High Blood Pressure Mother     Colon Polyps Father     Heart Disease Father     High Blood Pressure Father     Colon Cancer Neg Hx     Liver Cancer Neg Hx      Social History     Tobacco Use    Smoking status: Never    Smokeless tobacco: Never   Substance Use Topics    Alcohol use: No       Current Outpatient Medications   Medication Sig Dispense Refill    omeprazole (PRILOSEC) 40 MG delayed release capsule Take 1 capsule by mouth every morning (before breakfast) 90 capsule 3    hydrocortisone (ANUSOL-HC) 25 MG suppository Place 1 suppository rectally in the morning and 1 suppository in the evening.  29 suppository 0    vitamin D (ERGOCALCIFEROL) 1.25 MG (40548 UT) CAPS capsule Take 1 capsule by mouth once a week 12 capsule 0    UNABLE TO FIND Take 1 caplet by mouth daily [place]      UNABLE TO FIND Take 1 caplet by mouth daily Total Beets      Cyanocobalamin (VITAMIN B-12 CR PO) Take by mouth      Turmeric (QC TUMERIC COMPLEX PO) Take by mouth      Ela, Zingiber officinalis, (ELA PO) Take by mouth      APPLE CIDER VINEGAR PO Take by mouth      Ferrous Gluconate (IRON 27 PO) Take by mouth      Cholecalciferol (D-3-5 PO) Take by mouth      BIOTIN PO Take by mouth      MILK THISTLE PO Take by mouth      Probiotic Product (PROBIOTIC-10 PO) Take by mouth       Current Facility-Administered Medications   Medication Dose Route Frequency Provider Last Rate Last Admin    triamcinolone acetonide (KENALOG-40) injection 40 mg  40 mg IntraMUSCular Once [place] MICHAEL Vaca         Allergies   Allergen Reactions    Cetirizine     Cinnamon     Clindamycin/Lincomycin     Sweetness Enhancer     Triamcinolone Acetonide     Zyrtec Allergy [Cetirizine Hcl]     Botox [Botulinum Toxin Type A]      Had botox injections in neck. Caused her throat to become very dry and was hard to swallow. Vitals:    12/02/22 1111   BP: (!) 164/111   Pulse: 66     Body mass index is 39.47 kg/m². Review of Systems   Constitutional: Negative. HENT: Negative. Eyes: Negative. Respiratory: Negative. Cardiovascular: Negative. Gastrointestinal: Negative. Endocrine: Negative. Genitourinary:  Positive for menstrual problem. Negative for difficulty urinating, dyspareunia, dysuria, enuresis, frequency, hematuria, pelvic pain, urgency and vaginal discharge. Musculoskeletal: Negative. Skin: Negative. Allergic/Immunologic: Negative. Neurological: Negative. Hematological: Negative. Psychiatric/Behavioral: Negative. Physical Exam  Vitals and nursing note reviewed.    Constitutional:       General: She is not in acute distress. Appearance: She is well-developed. She is not diaphoretic. HENT:      Head: Normocephalic and atraumatic. Eyes:      Conjunctiva/sclera: Conjunctivae normal.      Pupils: Pupils are equal, round, and reactive to light. Pulmonary:      Effort: Pulmonary effort is normal.   Abdominal:      Tenderness: There is no guarding. Musculoskeletal:         General: Normal range of motion. Cervical back: Normal range of motion. Comments: Normal ROM in all 4 extremities; normal gait   Skin:     General: Skin is warm and dry. Neurological:      Mental Status: She is alert and oriented to person, place, and time. Motor: No abnormal muscle tone. Coordination: Coordination normal.   Psychiatric:         Behavior: Behavior normal.     Endometrial Biopsy Procedure Note    Pre-operative Diagnosis: thickened endo, pmb    Post-operative Diagnosis: same    Indications: postmenopausal bleeding    Procedure Details    Urine pregnancy test was not done. The risks (including infection, bleeding, pain, and uterine perforation) and benefits of the procedure were explained to the patient and Written informed consent was obtained. Antibiotic prophylaxis against endocarditis was not indicated. The patient was placed in the dorsal lithotomy position. Bimanual exam showed the uterus to be in the neutral position. A Graves' speculum inserted in the vagina, and the cervix prepped with povidone iodine. Endocervical curettage with a Kevorkian curette was not performed. A sharp tenaculum was applied to the anterior lip of the cervix for stabilization. A sterile uterine sound was used to sound the uterus to a depth of 8cm. A Pipelle endometrial aspirator was used to sample the endometrium. Sample was sent for pathologic examination. Condition:  Stable    Complications:  None    Plan:    The patient was advised to call for any fever or for prolonged or severe pain or bleeding.  She was advised to use OTC ibuprofen as needed for mild to moderate pain. She was advised to avoid vaginal intercourse for 48 hours or until the bleeding has completely stopped. Attending Physician Documentation:  I was present for or participated in the entire procedure, including opening and closing. Colposcopy Procedure Note    Indications: Pap smear 1 months ago showed: low-grade squamous intraepithelial neoplasia (LGSIL - encompassing HPV,mild dysplasia,JUNA DANIEL I). The prior pap showed  unknown . Prior cervical/vaginal disease: unknown. Prior cervical treatment:  LEEP . Procedure Details   The risks and benefits of the procedure and Written informed consent obtained. Speculum placed in vagina and excellent visualization of cervix achieved, cervix swabbed x 3 with acetic acid solution. Findings:  Cervix: acetowhite lesion(s) noted at 12 and 3 o'clock; SCJ visualized - lesion at 12 and 3 o'clock, endocervical curettage performed, cervical biopsies taken at 12 and 3 o'clock, specimen labelled and sent to pathology, and hemostasis achieved with silver nitrate. Vaginal inspection: vaginal colposcopy not performed  Vulvar colposcopy: vulvar colposcopy not performed. Specimens: ECC, 12 and 3 oclock    Complications: none. Plan:  Specimens labelled and sent to Pathology. Will base further treatment on Pathology findings. Treatment options discussed with patient. Post biopsy instructions given to patient. Diagnosis Orders   1. Encounter to discuss test results        2. Endometrial thickening on ultrasound  Surgical Pathology    61766 - [place] BIOPSY OF UTERUS LINING      3. Low grade squamous intraepithelial lesion (LGSIL) on cervical Pap smear  04769 - [place] COLPOSC,CERVIX W/ADJ VAG,W/BX & CURRETAG    Surgical Pathology      4. Cervical high risk HPV (human papillomavirus) test positive  67702 - [place] COLPOSC,CERVIX W/ADJ VAG,W/BX & CURRETAG    Surgical Pathology      5.  PMB (postmenopausal bleeding) MEDICATIONS:  No orders of the defined types were placed in this encounter. ORDERS:  Orders Placed This Encounter   Procedures    Surgical Pathology    Surgical Pathology    73740 - [place] COLPOSC,CERVIX W/ADJ VAG,W/BX & CURRETAG    93690 - [place] BIOPSY OF UTERUS LINING       PLAN:  Specimens sent. Aware may need D&C at minimum but if abnormal pathology may need gyn onc. There are no Patient Instructions on file for this visit.

## 2022-12-06 ENCOUNTER — PATIENT MESSAGE (OUTPATIENT)
Dept: GASTROENTEROLOGY | Age: 47
End: 2022-12-06

## 2022-12-07 NOTE — TELEPHONE ENCOUNTER
12-7-22    Patient called leaving a  from [phone] said she was returning a call, I will forward to [facility] to review.   greg

## 2022-12-09 ENCOUNTER — TELEPHONE (OUTPATIENT)
Dept: OBGYN CLINIC | Age: 47
End: 2022-12-09

## 2022-12-09 NOTE — TELEPHONE ENCOUNTER
Called pt as requested. She states since her EMB, she has been bleeding with clots. She would like a surgical option, but states cannot do it until February due to cosmetic surgery and dental surgery. I told her I will forward her chart to Dr. Mary Jane Treadwell nurse to schedule a D&C. Pt v/u.

## 2022-12-09 NOTE — TELEPHONE ENCOUNTER
----- Message from OLIVIA Araiza sent at 12/9/2022 10:19 AM CST -----  Regarding: results  This was kaiden's new pt but I did her colpo and emb. All bx's were negative. However her endometrium was thickened at 1cm and had previously been >1 yr without period. Then had one 6 months (im reading in kaiden's notes). Fsh 33, sounds like early menopause. Please ask her if she is wanting to consider surgical intervention for bleeding and please see how bleeding has been since her visit here. Is she still having periods? Bleeding all the time?  I'm trying to get plan and decide if needs D&C at minimum or if we can just start the clock back to once full year no bleeding, menopause  ----- Message -----  From: Donna Carter Incoming Lab Results From [facility]  Sent: 12/6/2022  10:03 AM CST  To: OLIVIA Araiza

## 2022-12-12 NOTE — TELEPHONE ENCOUNTER
Called to schedule D&C with Dr. Zoe Naidu. Can schedule surgery on 2/3, 2/10, or 2/24. VM is full. Will send my chart.

## 2023-01-17 ENCOUNTER — OFFICE VISIT (OUTPATIENT)
Age: 48
End: 2023-01-17
Payer: COMMERCIAL

## 2023-01-17 ENCOUNTER — HOSPITAL ENCOUNTER (OUTPATIENT)
Dept: GENERAL RADIOLOGY | Age: 48
Discharge: HOME | End: 2023-01-17
Payer: COMMERCIAL

## 2023-01-17 VITALS
OXYGEN SATURATION: 96 % | HEIGHT: 72 IN | WEIGHT: 266.4 LBS | RESPIRATION RATE: 20 BRPM | HEART RATE: 95 BPM | TEMPERATURE: 97.2 F | SYSTOLIC BLOOD PRESSURE: 122 MMHG | BODY MASS INDEX: 36.08 KG/M2 | DIASTOLIC BLOOD PRESSURE: 74 MMHG

## 2023-01-17 DIAGNOSIS — R06.2 WHEEZING: ICD-10-CM

## 2023-01-17 DIAGNOSIS — R05.1 ACUTE COUGH: ICD-10-CM

## 2023-01-17 DIAGNOSIS — J40 BRONCHITIS: Primary | ICD-10-CM

## 2023-01-17 PROCEDURE — G8417 CALC BMI ABV UP PARAM F/U: HCPCS | Performed by: NURSE PRACTITIONER

## 2023-01-17 PROCEDURE — G8484 FLU IMMUNIZE NO ADMIN: HCPCS | Performed by: NURSE PRACTITIONER

## 2023-01-17 PROCEDURE — 1036F TOBACCO NON-USER: CPT | Performed by: NURSE PRACTITIONER

## 2023-01-17 PROCEDURE — G8427 DOCREV CUR MEDS BY ELIG CLIN: HCPCS | Performed by: NURSE PRACTITIONER

## 2023-01-17 PROCEDURE — 99213 OFFICE O/P EST LOW 20 MIN: CPT | Performed by: NURSE PRACTITIONER

## 2023-01-17 PROCEDURE — 71046 X-RAY EXAM CHEST 2 VIEWS: CPT

## 2023-01-17 PROCEDURE — 96372 THER/PROPH/DIAG INJ SC/IM: CPT | Performed by: NURSE PRACTITIONER

## 2023-01-17 PROCEDURE — 71046 X-RAY EXAM CHEST 2 VIEWS: CPT | Performed by: RADIOLOGY

## 2023-01-17 RX ORDER — ALBUTEROL SULFATE 90 UG/1
2 AEROSOL, METERED RESPIRATORY (INHALATION) EVERY 6 HOURS PRN
Qty: 18 G | Refills: 3 | Status: SHIPPED | OUTPATIENT
Start: 2023-01-17

## 2023-01-17 RX ORDER — AMOXICILLIN AND CLAVULANATE POTASSIUM 875; 125 MG/1; MG/1
1 TABLET, FILM COATED ORAL 2 TIMES DAILY
Qty: 20 TABLET | Refills: 0 | Status: SHIPPED | OUTPATIENT
Start: 2023-01-17 | End: 2023-01-27

## 2023-01-17 RX ORDER — DEXAMETHASONE SODIUM PHOSPHATE 10 MG/ML
10 INJECTION INTRAMUSCULAR; INTRAVENOUS ONCE
Status: COMPLETED | OUTPATIENT
Start: 2023-01-17 | End: 2023-01-17

## 2023-01-17 RX ADMIN — DEXAMETHASONE SODIUM PHOSPHATE 10 MG: 10 INJECTION INTRAMUSCULAR; INTRAVENOUS at 10:04

## 2023-01-17 ASSESSMENT — ENCOUNTER SYMPTOMS
SHORTNESS OF BREATH: 0
ABDOMINAL PAIN: 0
COLOR CHANGE: 0
STRIDOR: 0
EYE PAIN: 0
WHEEZING: 0
ABDOMINAL DISTENTION: 0
COUGH: 1
SINUS PRESSURE: 0
EYE DISCHARGE: 0
SORE THROAT: 0
TROUBLE SWALLOWING: 0
CHEST TIGHTNESS: 0

## 2023-01-17 NOTE — PATIENT INSTRUCTIONS
CXR pending  Dex IM given in office  Albuterol and Augmentin sent to pharmacy  If symptoms worsen go to ER  Advise follow-up with PCP    Patient verbalized understanding and agrees to treatment plan.

## 2023-01-17 NOTE — PROGRESS NOTES
[id number]  [address] [address] [place] [address]  Dept: [phone]  Dept Fax: [phone]  Loc: [phone]    Maricarmen Bowen is a 49 y.o. female who presents today for her medical conditions/complaints as noted below. Maricarmen Bowen is complaining of Congestion (Burning in chest ), Cough, and Fever (Last week fever 102.2)        HPI:   Cough  Pertinent negatives include no chest pain, chills, fever, rash, sore throat, shortness of breath or wheezing. Fever   Associated symptoms include congestion and coughing. Pertinent negatives include no abdominal pain, chest pain, rash, sore throat, urinary pain or wheezing. Ervin Clemens presents to the office complaining of cough, congestion, wheezing, and chest tightness. Patient states that she ran fevers up to 103 last week. She is no longer running fevers but states she has been left with this persisting cough and congestion. Denies recent antibiotics.     Past Medical History:   Diagnosis Date    Chronic back pain     H. pylori infection 11/2017    Osteoarthritis        Past Surgical History:   Procedure Laterality Date    CERVICAL FUSION      COLONOSCOPY  2016    normal per pt    COLONOSCOPY N/A 12/01/2022    Dr Andreina Ramirez, non-bleeding hemorrhoids and a small anal fissure-BCM, 5 year recall    MANDIBLE SURGERY      both sides-genetic diagnosis    TUBAL LIGATION      UPPER GASTROINTESTINAL ENDOSCOPY  2016    H Pylori per pt    UPPER GASTROINTESTINAL ENDOSCOPY N/A 12/01/2022    Dr Ezio Grullon-[id number], benign-appearing stricture in the distal esophagus, gastritis, no sprue or h pylori    UPPER GASTROINTESTINAL ENDOSCOPY N/A 12/01/2022    Dr Ezio Grullon-[id number], benign-appearing stricture in the distal esophagus, gastritis, no sprue or h pylori       Family History   Problem Relation Age of Onset    High Blood Pressure Mother     Colon Polyps Father     Heart Disease Father High Blood Pressure Father     Colon Cancer Neg Hx     Liver Cancer Neg Hx        Social History     Tobacco Use    Smoking status: Never    Smokeless tobacco: Never   Substance Use Topics    Alcohol use: No        Current Outpatient Medications   Medication Sig Dispense Refill    amoxicillin-clavulanate (AUGMENTIN) 875-125 MG per tablet Take 1 tablet by mouth 2 times daily for 10 days 20 tablet 0    albuterol sulfate HFA (PROVENTIL HFA) 108 (90 Base) MCG/ACT inhaler Inhale 2 puffs into the lungs every 6 hours as needed for Wheezing 18 g 3    omeprazole (PRILOSEC) 40 MG delayed release capsule Take 1 capsule by mouth every morning (before breakfast) (Patient not taking: Reported on 1/17/2023) 90 capsule 3    hydrocortisone (ANUSOL-HC) 25 MG suppository Place 1 suppository rectally in the morning and 1 suppository in the evening.  (Patient not taking: Reported on 1/17/2023) 28 suppository 0    vitamin D (ERGOCALCIFEROL) 1.25 MG (63774 UT) CAPS capsule Take 1 capsule by mouth once a week (Patient not taking: Reported on 1/17/2023) 12 capsule 0    UNABLE TO FIND Take 1 caplet by mouth daily [place] (Patient not taking: Reported on 1/17/2023)      UNABLE TO FIND Take 1 caplet by mouth daily Total Beets      Cyanocobalamin (VITAMIN B-12 CR PO) Take by mouth (Patient not taking: Reported on 1/17/2023)      Turmeric (QC TUMERIC COMPLEX PO) Take by mouth (Patient not taking: Reported on 1/17/2023)      Ginger, Zingiber officinalis, (GINGER PO) Take by mouth (Patient not taking: Reported on 1/17/2023)      APPLE CIDER VINEGAR PO Take by mouth (Patient not taking: Reported on 1/17/2023)      Ferrous Gluconate (IRON 27 PO) Take by mouth (Patient not taking: Reported on 1/17/2023)      Cholecalciferol (D-3-5 PO) Take by mouth (Patient not taking: Reported on 1/17/2023)      BIOTIN PO Take by mouth (Patient not taking: Reported on 1/17/2023)      MILK THISTLE PO Take by mouth (Patient not taking: Reported on 1/17/2023) Probiotic Product (PROBIOTIC-10 PO) Take by mouth (Patient not taking: Reported on 1/17/2023)       Current Facility-Administered Medications   Medication Dose Route Frequency Provider Last Rate Last Admin    dexamethasone (DECADRON) injection 10 mg  10 mg IntraMUSCular Once Ligia Díaz APRN - MICHELE        triamcinolone acetonide (KENALOG-40) injection 40 mg  40 mg IntraMUSCular Once Christophe Rico PA-C           Allergies   Allergen Reactions    Cetirizine     Cinnamon     Clindamycin/Lincomycin     Sweetness Enhancer     Triamcinolone Acetonide     Zyrtec Allergy [Cetirizine Hcl]     Botox [Botulinum Toxin Type A]      Had botox injections in neck. Caused her throat to become very dry and was hard to swallow. Health Maintenance   Topic Date Due    COVID-19 Vaccine (1) Never done    Depression Screen  Never done    HIV screen  Never done    Hepatitis C screen  Never done    DTaP/Tdap/Td vaccine (1 - Tdap) 02/18/2000    Flu vaccine (1) Never done    Lipids  11/16/2027    Cervical cancer screen  11/16/2027    Colorectal Cancer Screen  12/01/2027    Hepatitis A vaccine  Aged Out    Hib vaccine  Aged Out    Meningococcal (ACWY) vaccine  Aged Out    Pneumococcal 0-64 years Vaccine  Aged Out       Subjective:   Review of Systems   Constitutional:  Negative for chills, fatigue and fever. HENT:  Positive for congestion. Negative for sinus pressure, sore throat and trouble swallowing. Eyes:  Negative for pain and discharge. Respiratory:  Positive for cough. Negative for chest tightness, shortness of breath, wheezing and stridor. Cardiovascular:  Negative for chest pain and palpitations. Gastrointestinal:  Negative for abdominal distention and abdominal pain. Genitourinary:  Negative for difficulty urinating, dysuria and hematuria. Musculoskeletal:  Negative for arthralgias, neck pain and neck stiffness. Skin:  Negative for color change and rash.    Neurological:  Negative for dizziness, syncope, speech difficulty, weakness and numbness. Psychiatric/Behavioral:  Negative for confusion and suicidal ideas. Objective    Physical Exam  Vitals and nursing note reviewed. Constitutional:       General: She is not in acute distress. Appearance: Normal appearance. HENT:      Head: Normocephalic. Right Ear: Tympanic membrane, ear canal and external ear normal.      Left Ear: Tympanic membrane, ear canal and external ear normal.      Nose: Nose normal. No congestion or rhinorrhea. Mouth/Throat:      Mouth: Mucous membranes are moist.      Pharynx: Oropharynx is clear. No posterior oropharyngeal erythema. Eyes:      Conjunctiva/sclera: Conjunctivae normal.      Pupils: Pupils are equal, round, and reactive to light. Cardiovascular:      Rate and Rhythm: Normal rate and regular rhythm. Pulses: Normal pulses. Heart sounds: Normal heart sounds. No murmur heard. Pulmonary:      Effort: Pulmonary effort is normal. No respiratory distress. Breath sounds: No stridor. Wheezing (severe wheezing throughout bilateral lungs) present. Abdominal:      General: Abdomen is flat. Bowel sounds are normal. There is no distension. Tenderness: There is no abdominal tenderness. Musculoskeletal:         General: No swelling or deformity. Normal range of motion. Cervical back: Normal range of motion. No rigidity or tenderness. Skin:     General: Skin is warm and dry. Findings: No rash. Neurological:      General: No focal deficit present. Mental Status: She is alert and oriented to person, place, and time. Sensory: No sensory deficit. /74 (Site: Left Upper Arm)   Pulse 95   Temp 97.2 °F (36.2 °C) (Temporal)   Resp 20   Ht 6' (1.829 m)   Wt 266 lb 6.4 oz (120.8 kg)   LMP 08/07/2018   SpO2 96%   BMI 36.13 kg/m²     Assessment         Diagnosis Orders   1.  Bronchitis  amoxicillin-clavulanate (AUGMENTIN) 875-125 MG per tablet    albuterol sulfate HFA (PROVENTIL HFA) 108 (90 Base) MCG/ACT inhaler      2. Acute cough  XR CHEST STANDARD (2 VW)      3. Wheezing  XR CHEST STANDARD (2 VW)          Plan   CXR pending  Dex IM given in office  Albuterol and Augmentin sent to pharmacy  If symptoms worsen go to ER  Advise follow-up with PCP    Patient verbalized understanding and agrees to treatment plan. Orders Placed This Encounter   Procedures    XR CHEST STANDARD (2 VW)     Standing Status:   Future     Standing Expiration Date:   1/17/2024       No results found for this visit on 01/17/23. Orders Placed This Encounter   Medications    dexamethasone (DECADRON) injection 10 mg    amoxicillin-clavulanate (AUGMENTIN) 875-125 MG per tablet     Sig: Take 1 tablet by mouth 2 times daily for 10 days     Dispense:  20 tablet     Refill:  0    albuterol sulfate HFA (PROVENTIL HFA) 108 (90 Base) MCG/ACT inhaler     Sig: Inhale 2 puffs into the lungs every 6 hours as needed for Wheezing     Dispense:  18 g     Refill:  3      New Prescriptions    ALBUTEROL SULFATE HFA (PROVENTIL HFA) 108 (90 BASE) MCG/ACT INHALER    Inhale 2 puffs into the lungs every 6 hours as needed for Wheezing    AMOXICILLIN-CLAVULANATE (AUGMENTIN) 875-125 MG PER TABLET    Take 1 tablet by mouth 2 times daily for 10 days        No follow-ups on file. Discussed use, benefits, and side effects of any prescribed medications. All patient questions were answered. Patient voiced understanding of care plan. Patient was given educational materials - see patient instructions below. Patient Instructions   CXR pending  Dex IM given in office  Albuterol and Augmentin sent to pharmacy  If symptoms worsen go to ER  Advise follow-up with PCP    Patient verbalized understanding and agrees to treatment plan.       Electronically signed by OLIVIA Najera CNP on 1/17/2023 at 10:01 AM

## 2023-01-17 NOTE — LETTER
[facility]  [address] [place] [address] [phone]  Phone: [phone]  Fax: [phone]    OLIVIA Rocha CNP        January 17, 2023     Patient: Meggan Leroy   YOB: 1975   Date of Visit: 1/17/2023       To Whom it May Concern:    Meggan Leroy was seen in my clinic on 1/17/2023. She may return to work on 1-18-23. If you have any questions or concerns, please don't hesitate to call.     Sincerely,         OLIVIA Rocha CNP

## 2023-03-03 ENCOUNTER — TELEPHONE (OUTPATIENT)
Dept: OBGYN CLINIC | Age: 48
End: 2023-03-03

## 2023-03-03 DIAGNOSIS — Z98.890 RECENT MAJOR SURGERY: ICD-10-CM

## 2023-03-03 DIAGNOSIS — R10.2 PELVIC PAIN IN FEMALE: Primary | ICD-10-CM

## 2023-03-03 NOTE — TELEPHONE ENCOUNTER
Returned call to pt. Pt had cosmetic surgery done in [facility] on 23-23-86. She states she has not had bleeding since then. I told her I will check to see if she still needs a D&C. She also states she is having stomach and pelvic pain and would like an ultrasound done. I told her I will check with Estrella and let her know. She v/u.

## 2023-03-09 NOTE — TELEPHONE ENCOUNTER
Pt is wanting abdominal ultrasound due to \"knots\" above her tummy tuck scar. Informed pt that we will order but may need need transvaginal one as well if not able to see lining.  Pt VU

## 2023-03-09 NOTE — TELEPHONE ENCOUNTER
Chapincito Desai requests that clinic return her call. The best time to reach her is Anytime. Chapincito Desai stated that she has a transvaginal ultrasound tomorrow scheduled. She states that a transvaginal is not necessary and would like a different type of ultrasound over the stomach instead due to her stomach and pelvic pain. Patient also complained of painful knots on her stomach area and shooting pains. Please call patient to advise whether or not she should cancel her transvaginal ultrasound tomorrow. Thank you.

## 2023-03-17 ENCOUNTER — HOSPITAL ENCOUNTER (OUTPATIENT)
Dept: ULTRASOUND IMAGING | Age: 48
Discharge: HOME | End: 2023-03-17
Payer: COMMERCIAL

## 2023-03-17 DIAGNOSIS — Z98.890 RECENT MAJOR SURGERY: ICD-10-CM

## 2023-03-17 DIAGNOSIS — R10.2 PELVIC PAIN IN FEMALE: ICD-10-CM

## 2023-03-17 PROCEDURE — 76705 ECHO EXAM OF ABDOMEN: CPT

## 2023-03-17 PROCEDURE — 76830 TRANSVAGINAL US NON-OB: CPT

## 2023-03-22 ENCOUNTER — TELEPHONE (OUTPATIENT)
Dept: OBGYN CLINIC | Age: 48
End: 2023-03-22

## 2023-03-22 DIAGNOSIS — R10.2 PELVIC PAIN IN FEMALE: ICD-10-CM

## 2023-03-22 DIAGNOSIS — Z98.890 RECENT MAJOR SURGERY: Primary | ICD-10-CM

## 2023-03-22 DIAGNOSIS — Z98.890 S/P ABDOMINOPLASTY: ICD-10-CM

## 2023-03-22 NOTE — TELEPHONE ENCOUNTER
Called and informed pt that her TVUS indicates a fibroid. I explained that she can have a Hysteroscopy D&C as originally planned. She states she will call us if she bleeds again and will discuss a hysterectomy. I also explained that her US Abdomen limited indicates midline seromas and fat necrosis at her midline incision from her tummy tuck done in hospitals. She states she has pain when she \"stretches\" and that there are about 15 bumps along her incision line. She is going to see a plastic surgeon at [facility].  I offered her a referral to [facility] per Dr. Aleida Duff. She said to put the referral in and if she decides against it, she will cancel the appt.

## 2023-05-13 ENCOUNTER — OFFICE VISIT (OUTPATIENT)
Age: 48
End: 2023-05-13

## 2023-05-13 ENCOUNTER — APPOINTMENT (OUTPATIENT)
Dept: CT IMAGING | Age: 48
End: 2023-05-13
Payer: COMMERCIAL

## 2023-05-13 ENCOUNTER — HOSPITAL ENCOUNTER (EMERGENCY)
Age: 48
Discharge: HOME | End: 2023-05-13
Attending: EMERGENCY MEDICINE
Payer: COMMERCIAL

## 2023-05-13 VITALS
RESPIRATION RATE: 22 BRPM | HEART RATE: 100 BPM | WEIGHT: 264 LBS | DIASTOLIC BLOOD PRESSURE: 88 MMHG | TEMPERATURE: 98.2 F | OXYGEN SATURATION: 98 % | BODY MASS INDEX: 35.76 KG/M2 | SYSTOLIC BLOOD PRESSURE: 132 MMHG | HEIGHT: 72 IN

## 2023-05-13 VITALS
DIASTOLIC BLOOD PRESSURE: 109 MMHG | OXYGEN SATURATION: 100 % | HEART RATE: 86 BPM | RESPIRATION RATE: 18 BRPM | TEMPERATURE: 98.5 F | SYSTOLIC BLOOD PRESSURE: 150 MMHG

## 2023-05-13 DIAGNOSIS — S30.1XXA ABDOMINAL WALL SEROMA, INITIAL ENCOUNTER: ICD-10-CM

## 2023-05-13 DIAGNOSIS — T81.49XA POSTOPERATIVE WOUND INFECTION: Primary | ICD-10-CM

## 2023-05-13 DIAGNOSIS — T81.49XA POSTOPERATIVE WOUND CELLULITIS: Primary | ICD-10-CM

## 2023-05-13 LAB
ALBUMIN SERPL-MCNC: 3.3 G/DL (ref 3.5–5.2)
ALP SERPL-CCNC: 117 U/L (ref 35–104)
ALT SERPL-CCNC: 12 U/L (ref 5–33)
ANION GAP SERPL CALCULATED.3IONS-SCNC: 10 MMOL/L (ref 7–19)
AST SERPL-CCNC: 15 U/L (ref 5–32)
BASOPHILS # BLD: 0 K/UL (ref 0–0.2)
BASOPHILS NFR BLD: 0.2 % (ref 0–1)
BILIRUB SERPL-MCNC: 0.5 MG/DL (ref 0.2–1.2)
BUN SERPL-MCNC: 9 MG/DL (ref 6–20)
CALCIUM SERPL-MCNC: 8.9 MG/DL (ref 8.6–10)
CHLORIDE SERPL-SCNC: 102 MMOL/L (ref 98–111)
CO2 SERPL-SCNC: 21 MMOL/L (ref 22–29)
CREAT SERPL-MCNC: 0.6 MG/DL (ref 0.5–0.9)
EOSINOPHIL # BLD: 0.1 K/UL (ref 0–0.6)
EOSINOPHIL NFR BLD: 0.7 % (ref 0–5)
ERYTHROCYTE [DISTWIDTH] IN BLOOD BY AUTOMATED COUNT: 13.8 % (ref 11.5–14.5)
GLUCOSE SERPL-MCNC: 88 MG/DL (ref 74–109)
HCT VFR BLD AUTO: 43.1 % (ref 37–47)
HGB BLD-MCNC: 12.7 G/DL (ref 12–16)
IMM GRANULOCYTES # BLD: 0 K/UL
LACTATE BLDV-SCNC: 1 MG/DL (ref 0.5–1.9)
LYMPHOCYTES # BLD: 2.5 K/UL (ref 1.1–4.5)
LYMPHOCYTES NFR BLD: 22.3 % (ref 20–40)
MCH RBC QN AUTO: 27.8 PG (ref 27–31)
MCHC RBC AUTO-ENTMCNC: 29.5 G/DL (ref 33–37)
MCV RBC AUTO: 94.3 FL (ref 81–99)
MONOCYTES # BLD: 0.7 K/UL (ref 0–0.9)
MONOCYTES NFR BLD: 6 % (ref 0–10)
NEUTROPHILS # BLD: 8 K/UL (ref 1.5–7.5)
NEUTS SEG NFR BLD: 70.5 % (ref 50–65)
PLATELET # BLD AUTO: 280 K/UL (ref 130–400)
PMV BLD AUTO: 9.5 FL (ref 9.4–12.3)
POTASSIUM SERPL-SCNC: 3.9 MMOL/L (ref 3.5–5)
PROT SERPL-MCNC: 7.3 G/DL (ref 6.6–8.7)
RBC # BLD AUTO: 4.57 M/UL (ref 4.2–5.4)
SODIUM SERPL-SCNC: 133 MMOL/L (ref 136–145)
WBC # BLD AUTO: 11.3 K/UL (ref 4.8–10.8)

## 2023-05-13 PROCEDURE — 80053 COMPREHEN METABOLIC PANEL: CPT

## 2023-05-13 PROCEDURE — 74177 CT ABD & PELVIS W/CONTRAST: CPT

## 2023-05-13 PROCEDURE — 6360000004 HC RX CONTRAST MEDICATION: Performed by: EMERGENCY MEDICINE

## 2023-05-13 PROCEDURE — 99285 EMERGENCY DEPT VISIT HI MDM: CPT

## 2023-05-13 PROCEDURE — 85025 COMPLETE CBC W/AUTO DIFF WBC: CPT

## 2023-05-13 PROCEDURE — 36415 COLL VENOUS BLD VENIPUNCTURE: CPT

## 2023-05-13 PROCEDURE — 83605 ASSAY OF LACTIC ACID: CPT

## 2023-05-13 PROCEDURE — 99283 EMERGENCY DEPT VISIT LOW MDM: CPT | Performed by: SURGERY

## 2023-05-13 RX ORDER — DOXYCYCLINE HYCLATE 100 MG
100 TABLET ORAL 2 TIMES DAILY
Qty: 20 TABLET | Refills: 0 | Status: SHIPPED | OUTPATIENT
Start: 2023-05-13 | End: 2023-05-23

## 2023-05-13 RX ADMIN — IOPAMIDOL 70 ML: 755 INJECTION, SOLUTION INTRAVENOUS at 14:10

## 2023-05-13 ASSESSMENT — ENCOUNTER SYMPTOMS
COUGH: 0
DIARRHEA: 0
NAUSEA: 0
SHORTNESS OF BREATH: 0
VOMITING: 0
CONSTIPATION: 0
ABDOMINAL PAIN: 1
ABDOMINAL DISTENTION: 0

## 2023-05-13 NOTE — CONSULTS
[facility] General Surgery     Consult Note    Patient ID: Gabby Garcia  51 y.o.  female  YOB: 1975    Admitting Diagnosis: No admission diagnoses are documented for this encounter. Subjective:      Ms. Higinio Faustin is a very pleasant 50-year-old woman whom I am asked to see in regards to cellulitis and possible subcutaneous abscesses following revision abdominoplasty done in the [facility] about 2 weeks ago. Ms. Higinio Faustin initially underwent abdominoplasty in the [facility] in December of last year. 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 She saw Dr. Stephanie Ross in his office at [facility] and he was able to drain the majority of the fluid collections under ultrasound guidance. He followed her over about 3 months and undertook aspiration on several occasions to accomplish this. As her incision healed she developed areas of fat necrosis and puckering of the incision and thus returned to the [facility] about 2 weeks ago to have revision surgery. The incision was reopened. The remainder of the chronic seromas were drained and the incision then reclosed over drains. She did well while in the [facility] and then returned home. Drains were eventually removed 3 days ago by the nurse at her place of employment following guidance from her surgeon in the [facility]. She did well but then yesterday noted erythema across the incision. With persistence and low-grade fever she presented to the emergency department today for evaluation. CT scan of the abdomen was obtained. This documents 2 separate fluid collections each measuring roughly 5 x 5 x 2 cm in size. One lies just above the umbilicus the other between the umbilicus and her suprapubic incision. At present she denies significant abdominal pain or tenderness.   She has not had any spontaneous

## 2023-05-13 NOTE — ED PROVIDER NOTES
Temp: 98.5 °F (36.9 °C)   TempSrc: Tympanic   SpO2: 100%       MDM    I have independently reviewed patient's laboratory studies and CT imaging performed present here in the emergency department. WBC count is mildly elevated 11.3. Serum lactic acid is normal.  Serum creatinine is also normal.  CT imaging demonstrates evidence of seroma with possible infection and surrounding stranding. We will plan to consult general surgery. Plan of care is been discussed with patient and she is agreeable for discharge and outpatient follow-up. Return precautions were reviewed discussed and all questions been answered. CONSULTS:    Case discussed with Dr. Nayan Leroy regarding cervical consultation. He will plan to see and evaluate patient here in the emergency department. Plan of care will include discharge with oral antibiotics and discharge and outpatient follow-up with Dr. Kiya Mclain. PROCEDURES:  Unless otherwise noted below, none     Procedures    FINAL IMPRESSION      1. Postoperative wound cellulitis    2.  Abdominal wall seroma, initial encounter          DISPOSITION/PLAN   DISPOSITION Decision To Discharge 05/13/2023 04:45:55 PM      PATIENT REFERRED TO:  Ervin Christian MD    Schedule an appointment as soon as possible for a visit   [phone]      DISCHARGE MEDICATIONS:  New Prescriptions    DOXYCYCLINE HYCLATE (VIBRA-TABS) 100 MG TABLET    Take 1 tablet by mouth 2 times daily for 10 days          (Please note that portions of this note were completed with a voice recognition program.  Efforts were made to edit thedictations but occasionally words are mis-transcribed.)    Julia Balderrama MD (electronically signed)  Attending Emergency Physician          Julia Balderrama MD  05/13/23 [id number]

## 2023-05-13 NOTE — ED NOTES
DC instructions reviewed with pt and spouse. No questions. IV removed.       Chris Boothe, MORIAH  05/13/23 [id number]

## 2023-08-10 ENCOUNTER — OFFICE VISIT (OUTPATIENT)
Age: 48
End: 2023-08-10
Payer: COMMERCIAL

## 2023-08-10 VITALS
BODY MASS INDEX: 37.11 KG/M2 | SYSTOLIC BLOOD PRESSURE: 138 MMHG | HEIGHT: 72 IN | RESPIRATION RATE: 20 BRPM | TEMPERATURE: 97.9 F | WEIGHT: 274 LBS | HEART RATE: 82 BPM | DIASTOLIC BLOOD PRESSURE: 86 MMHG | OXYGEN SATURATION: 98 %

## 2023-08-10 DIAGNOSIS — L23.7 ALLERGIC CONTACT DERMATITIS DUE TO PLANTS, EXCEPT FOOD: Primary | ICD-10-CM

## 2023-08-10 PROCEDURE — G8417 CALC BMI ABV UP PARAM F/U: HCPCS | Performed by: NURSE PRACTITIONER

## 2023-08-10 PROCEDURE — G8427 DOCREV CUR MEDS BY ELIG CLIN: HCPCS | Performed by: NURSE PRACTITIONER

## 2023-08-10 PROCEDURE — 1036F TOBACCO NON-USER: CPT | Performed by: NURSE PRACTITIONER

## 2023-08-10 PROCEDURE — 96372 THER/PROPH/DIAG INJ SC/IM: CPT | Performed by: NURSE PRACTITIONER

## 2023-08-10 PROCEDURE — 99213 OFFICE O/P EST LOW 20 MIN: CPT | Performed by: NURSE PRACTITIONER

## 2023-08-10 RX ORDER — METHYLPREDNISOLONE 4 MG/1
TABLET ORAL
Qty: 1 KIT | Refills: 0 | Status: SHIPPED | OUTPATIENT
Start: 2023-08-10

## 2023-08-10 RX ORDER — DEXAMETHASONE SODIUM PHOSPHATE 10 MG/ML
8 INJECTION INTRAMUSCULAR; INTRAVENOUS ONCE
Status: COMPLETED | OUTPATIENT
Start: 2023-08-10 | End: 2023-08-10

## 2023-08-10 RX ADMIN — DEXAMETHASONE SODIUM PHOSPHATE 8 MG: 10 INJECTION INTRAMUSCULAR; INTRAVENOUS at 13:03

## 2023-08-10 ASSESSMENT — ENCOUNTER SYMPTOMS
EYES NEGATIVE: 1
EYE PAIN: 0
ALLERGIC/IMMUNOLOGIC NEGATIVE: 1
RESPIRATORY NEGATIVE: 1
DIARRHEA: 0
GASTROINTESTINAL NEGATIVE: 1

## 2023-08-10 NOTE — PATIENT INSTRUCTIONS
Start steroid pack tomorrow. Do not scratch rash. Keep rash clean and dry. Take a luke warm or cool shower. May take a lukewarm bath with oatmeal.    Use a mild soap such as Dove. May rotate with calazime cream.    May take Benadryl for itching every 6-8 hours. May start Pepcid twice daily. If child check with Pediatrician as to whether he/she may take Pepcid once daily.

## 2023-08-10 NOTE — PROGRESS NOTES
Dominga Lundberg (:  1975) is a 51 y.o. female,Established patient, here for evaluation of the following chief complaint(s):  Poison Ivy    She has taken Benadryl 2 days ago with no relief. She states she is unable to take Zyrtec due to it raising her blood pressure. She has taken steroids in the past for poison ivy. Denies taking steroids in the past 3 months. She is to see Ophthalmology if she develops any eye pain or vision change. Care instructions discussed. Patient verbalized understanding and agrees to plan of care. ASSESSMENT/PLAN:  1. Allergic contact dermatitis due to plants, except food  -     dexamethasone (DECADRON) injection 8 mg; 8 mg, IntraMUSCular, ONCE, 1 dose, On u 8/10/23 at 1330  -     methylPREDNISolone (MEDROL DOSEPACK) 4 MG tablet; Take by mouth., Disp-1 kit, R-0Normal     Orders Placed This Encounter   Medications    dexamethasone (DECADRON) injection 8 mg    methylPREDNISolone (MEDROL DOSEPACK) 4 MG tablet     Sig: Take by mouth. Dispense:  1 kit     Refill:  0        Return if symptoms worsen or fail to improve. Subjective   SUBJECTIVE/OBJECTIVE:  Poison Ressie Sinhala  This is a new problem. The current episode started in the past 7 days (2 days ago). The problem has been gradually worsening since onset. The affected locations include the chest, torso, neck, face, right arm, left arm, left wrist and right wrist. The rash is characterized by itchiness and swelling. She was exposed to plant contact (weedeating on a windy day). Associated symptoms include facial edema (mild). Pertinent negatives include no diarrhea, eye pain or fever. Treatments tried: Benadryl. The treatment provided no relief. Review of Systems   Constitutional: Negative. Negative for fever. HENT: Negative. Eyes: Negative. Negative for pain. Respiratory: Negative. Cardiovascular: Negative. Gastrointestinal: Negative. Negative for diarrhea. Endocrine: Negative.

## 2023-08-10 NOTE — PROGRESS NOTES
Medication was administered by Danii Patel at 1:04 PM.    Medication: dexamethasone  Amount: 8mg  Route: intramuscular  Site: Dorsogluteal left    Patient tolerated well.

## 2024-12-04 ENCOUNTER — OFFICE VISIT (OUTPATIENT)
Age: 49
End: 2024-12-04
Payer: COMMERCIAL

## 2024-12-04 VITALS
SYSTOLIC BLOOD PRESSURE: 138 MMHG | OXYGEN SATURATION: 97 % | TEMPERATURE: 97 F | BODY MASS INDEX: 39.87 KG/M2 | DIASTOLIC BLOOD PRESSURE: 84 MMHG | RESPIRATION RATE: 18 BRPM | WEIGHT: 293 LBS | HEART RATE: 86 BPM

## 2024-12-04 DIAGNOSIS — J06.9 VIRAL URI WITH COUGH: Primary | ICD-10-CM

## 2024-12-04 DIAGNOSIS — R05.9 COUGH, UNSPECIFIED TYPE: ICD-10-CM

## 2024-12-04 DIAGNOSIS — Z11.59 SCREENING FOR VIRAL DISEASE: ICD-10-CM

## 2024-12-04 LAB
INFLUENZA A ANTIBODY: NORMAL
INFLUENZA B ANTIBODY: NORMAL
Lab: NORMAL
QC PASS/FAIL: NORMAL
S PYO AG THROAT QL: NORMAL
SARS-COV-2, POC: NORMAL

## 2024-12-04 PROCEDURE — 87804 INFLUENZA ASSAY W/OPTIC: CPT

## 2024-12-04 PROCEDURE — 87880 STREP A ASSAY W/OPTIC: CPT

## 2024-12-04 PROCEDURE — 99214 OFFICE O/P EST MOD 30 MIN: CPT

## 2024-12-04 PROCEDURE — 3017F COLORECTAL CA SCREEN DOC REV: CPT

## 2024-12-04 PROCEDURE — G8417 CALC BMI ABV UP PARAM F/U: HCPCS

## 2024-12-04 PROCEDURE — 87811 SARS-COV-2 COVID19 W/OPTIC: CPT

## 2024-12-04 PROCEDURE — G8484 FLU IMMUNIZE NO ADMIN: HCPCS

## 2024-12-04 PROCEDURE — 1036F TOBACCO NON-USER: CPT

## 2024-12-04 PROCEDURE — G8427 DOCREV CUR MEDS BY ELIG CLIN: HCPCS

## 2024-12-04 RX ORDER — ALBUTEROL SULFATE 90 UG/1
2 INHALANT RESPIRATORY (INHALATION) 4 TIMES DAILY PRN
Qty: 18 G | Refills: 0 | Status: SHIPPED | OUTPATIENT
Start: 2024-12-04

## 2024-12-04 RX ORDER — METHYLPREDNISOLONE 4 MG/1
TABLET ORAL
Qty: 1 KIT | Refills: 0 | Status: SHIPPED | OUTPATIENT
Start: 2024-12-04 | End: 2024-12-10

## 2024-12-04 ASSESSMENT — ENCOUNTER SYMPTOMS
VOMITING: 0
DIARRHEA: 0
SHORTNESS OF BREATH: 0
SORE THROAT: 0
COUGH: 1
VOICE CHANGE: 1
ABDOMINAL PAIN: 0
NAUSEA: 0
WHEEZING: 0

## 2024-12-04 NOTE — PATIENT INSTRUCTIONS
- Antibiotic, steroid pack and Albuterol inhaler sent to the pharmacy, take as directed.  - Medications such as Zyrtec or Claritin may help with symptoms.   - Nasal decongestant, such as Flonase/Afrin as needed.  - OTC cough medicine like Delsym/Robitussin if applicable.  - Tylenol/Motrin as needed for body aches/fevers unless contraindicated.  - Multivitamin is recommended to help boost the immune system.  - Drink plenty of water to stay hydrated.  - May use humidifier as needed while sleeping.  - Allow adequate rest.  - Encourage deep breathing exercises.  - Recommended staying home until fever free for at least 24 hours without medication and symptoms are improving.  - Return to the clinic or follow up with PCP if symptoms worsen or fail to improve.

## 2025-07-17 ENCOUNTER — OFFICE VISIT (OUTPATIENT)
Age: 50
End: 2025-07-17

## 2025-07-17 VITALS
SYSTOLIC BLOOD PRESSURE: 134 MMHG | OXYGEN SATURATION: 96 % | TEMPERATURE: 97.6 F | WEIGHT: 233 LBS | BODY MASS INDEX: 31.6 KG/M2 | HEART RATE: 89 BPM | RESPIRATION RATE: 20 BRPM | DIASTOLIC BLOOD PRESSURE: 93 MMHG

## 2025-07-17 DIAGNOSIS — L23.7 POISON IVY: ICD-10-CM

## 2025-07-17 DIAGNOSIS — R03.0 ELEVATED BP WITHOUT DIAGNOSIS OF HYPERTENSION: ICD-10-CM

## 2025-07-17 DIAGNOSIS — L25.5 RHUS DERMATITIS: Primary | ICD-10-CM

## 2025-07-17 RX ORDER — DEXAMETHASONE SODIUM PHOSPHATE 10 MG/ML
10 INJECTION, SOLUTION INTRA-ARTICULAR; INTRALESIONAL; INTRAMUSCULAR; INTRAVENOUS; SOFT TISSUE ONCE
Status: COMPLETED | OUTPATIENT
Start: 2025-07-17 | End: 2025-07-17

## 2025-07-17 RX ORDER — METHYLPREDNISOLONE 4 MG/1
TABLET ORAL
Qty: 1 KIT | Refills: 0 | Status: SHIPPED | OUTPATIENT
Start: 2025-07-17 | End: 2025-07-23

## 2025-07-17 RX ADMIN — DEXAMETHASONE SODIUM PHOSPHATE 10 MG: 10 INJECTION, SOLUTION INTRA-ARTICULAR; INTRALESIONAL; INTRAMUSCULAR; INTRAVENOUS; SOFT TISSUE at 08:23

## 2025-07-17 NOTE — PROGRESS NOTES
in acute distress.     Appearance: Normal appearance. She is not ill-appearing.   HENT:      Head: Normocephalic.      Mouth/Throat:      Pharynx: Oropharynx is clear.   Cardiovascular:      Rate and Rhythm: Normal rate and regular rhythm.      Pulses: Normal pulses.   Pulmonary:      Effort: Pulmonary effort is normal.   Abdominal:      Tenderness: There is no abdominal tenderness.   Skin:     Comments: Maculo-papulo vesicular lesion in cluster in the R forearm and similar eruption around her lips   Neurological:      Mental Status: She is alert and oriented to person, place, and time.               An electronic signature was used to authenticate this note.    Ade Marinelli MD

## (undated) DEVICE — Device: Brand: DEFENDO AIR/WATER/SUCTION AND BIOPSY VALVE

## (undated) DEVICE — CONMED SCOPE SAVER BITE BLOCK, 20X27 MM: Brand: SCOPE SAVER

## (undated) DEVICE — ENDOGATOR AUXILIARY WATER JET CONNECTOR: Brand: ENDOGATOR

## (undated) DEVICE — SINGLE PORT MANIFOLD: Brand: NEPTUNE 2

## (undated) DEVICE — TBG SMPL FLTR LINE NASL 02/C02 A/ BX/100

## (undated) DEVICE — SENSR O2 OXIMAX FNGR A/ 18IN NONSTR

## (undated) DEVICE — ENDO KIT,LOURDES HOSPITAL: Brand: MEDLINE INDUSTRIES, INC.

## (undated) DEVICE — THE CHANNEL CLEANING BRUSH IS A NYLON FLEXI BRUSH ATTACHED TO A FLEXIBLE PLASTIC SHEATH DESIGNED TO SAFELY REMOVE DEBRIS FROM FLEXIBLE ENDOSCOPES.

## (undated) DEVICE — MSK O2 MD CONCENTR A/ LF 7FT 1P/U

## (undated) DEVICE — FORCEPS BX 240CM 2.4MM L NDL RAD JAW 4 M00513334

## (undated) DEVICE — CANNULA NSL AD L7FT DIV O2 CO2 W/ M LUERLOCK TRMPT CONN

## (undated) DEVICE — BITE BLOCK ENDOSCP AD 60 FR W/ ADJ STRP PLAS GRN BLOX

## (undated) DEVICE — CUFF,BP,DISP,1 TUBE,ADULT,HP: Brand: MEDLINE

## (undated) DEVICE — ENDO KIT: Brand: MEDLINE INDUSTRIES, INC.

## (undated) DEVICE — FRCP BX RADJAW4 NDL 2.8 240 STD OG